# Patient Record
Sex: MALE | URBAN - NONMETROPOLITAN AREA
[De-identification: names, ages, dates, MRNs, and addresses within clinical notes are randomized per-mention and may not be internally consistent; named-entity substitution may affect disease eponyms.]

---

## 2017-01-03 ENCOUNTER — APPOINTMENT (RX ONLY)
Dept: URBAN - NONMETROPOLITAN AREA CLINIC 1 | Facility: CLINIC | Age: 62
Setting detail: DERMATOLOGY
End: 2017-01-03

## 2017-01-03 VITALS — WEIGHT: 68 LBS | HEIGHT: 78 IN

## 2017-01-03 DIAGNOSIS — L82.1 OTHER SEBORRHEIC KERATOSIS: ICD-10-CM

## 2017-01-03 DIAGNOSIS — L65.9 NONSCARRING HAIR LOSS, UNSPECIFIED: ICD-10-CM

## 2017-01-03 DIAGNOSIS — D22 MELANOCYTIC NEVI: ICD-10-CM

## 2017-01-03 DIAGNOSIS — B00.1 HERPESVIRAL VESICULAR DERMATITIS: ICD-10-CM

## 2017-01-03 PROBLEM — E78.5 HYPERLIPIDEMIA, UNSPECIFIED: Status: ACTIVE | Noted: 2017-01-03

## 2017-01-03 PROBLEM — I10 ESSENTIAL (PRIMARY) HYPERTENSION: Status: ACTIVE | Noted: 2017-01-03

## 2017-01-03 PROBLEM — H91.90 UNSPECIFIED HEARING LOSS, UNSPECIFIED EAR: Status: ACTIVE | Noted: 2017-01-03

## 2017-01-03 PROBLEM — D23.5 OTHER BENIGN NEOPLASM OF SKIN OF TRUNK: Status: ACTIVE | Noted: 2017-01-03

## 2017-01-03 PROBLEM — D22.39 MELANOCYTIC NEVI OF OTHER PARTS OF FACE: Status: ACTIVE | Noted: 2017-01-03

## 2017-01-03 PROCEDURE — ? COUNSELING

## 2017-01-03 PROCEDURE — 99242 OFF/OP CONSLTJ NEW/EST SF 20: CPT

## 2017-01-03 ASSESSMENT — LOCATION ZONE DERM
LOCATION ZONE: LEG
LOCATION ZONE: FACE

## 2017-01-03 ASSESSMENT — LOCATION DETAILED DESCRIPTION DERM
LOCATION DETAILED: SUPERIOR MID FOREHEAD
LOCATION DETAILED: LEFT SUPERIOR CENTRAL MALAR CHEEK
LOCATION DETAILED: LEFT DISTAL POSTERIOR THIGH

## 2017-01-03 ASSESSMENT — LOCATION SIMPLE DESCRIPTION DERM
LOCATION SIMPLE: LEFT CHEEK
LOCATION SIMPLE: SUPERIOR FOREHEAD
LOCATION SIMPLE: LEFT POSTERIOR THIGH

## 2017-11-02 ENCOUNTER — HOSPITAL ENCOUNTER (OUTPATIENT)
Dept: RADIOLOGY | Facility: MEDICAL CENTER | Age: 62
End: 2017-11-02
Attending: EMERGENCY MEDICINE
Payer: COMMERCIAL

## 2017-11-02 ENCOUNTER — OFFICE VISIT (OUTPATIENT)
Dept: URGENT CARE | Facility: PHYSICIAN GROUP | Age: 62
End: 2017-11-02
Payer: COMMERCIAL

## 2017-11-02 VITALS
WEIGHT: 190 LBS | HEART RATE: 78 BPM | BODY MASS INDEX: 28.79 KG/M2 | TEMPERATURE: 98.5 F | OXYGEN SATURATION: 98 % | HEIGHT: 68 IN | DIASTOLIC BLOOD PRESSURE: 84 MMHG | RESPIRATION RATE: 16 BRPM | SYSTOLIC BLOOD PRESSURE: 142 MMHG

## 2017-11-02 DIAGNOSIS — S60.221A CONTUSION OF RIGHT HAND, INITIAL ENCOUNTER: ICD-10-CM

## 2017-11-02 PROCEDURE — 99214 OFFICE O/P EST MOD 30 MIN: CPT | Performed by: EMERGENCY MEDICINE

## 2017-11-02 PROCEDURE — 73130 X-RAY EXAM OF HAND: CPT | Mod: RT

## 2017-11-03 ASSESSMENT — ENCOUNTER SYMPTOMS
EYE DISCHARGE: 0
TINGLING: 0
FOCAL WEAKNESS: 0
FEVER: 0
NAUSEA: 0
INSOMNIA: 0
VOMITING: 0
NERVOUS/ANXIOUS: 0
EYE REDNESS: 0
CHILLS: 0
BRUISES/BLEEDS EASILY: 0

## 2017-11-04 NOTE — PROGRESS NOTES
Subjective:      Maximiliano Bateman is a 62 y.o. 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 The hand developed a lump on the dorsum immediately and has been slowly resolving.  Allergies   Allergen Reactions   • Norco [Hydrocodone-Acetaminophen] Unspecified     Strange dreams   RXN=8/2016     Social History     Social History   • Marital status:      Spouse name: N/A   • Number of children: N/A   • Years of education: N/A     Occupational History   • Not on file.     Social History Main Topics   • Smoking status: Never Smoker   • Smokeless tobacco: Never Used   • Alcohol use No   • Drug use: No   • Sexual activity: Yes     Partners: Female     Birth control/ protection: Post-Menopausal     Other Topics Concern   • Not on file     Social History Narrative   • No narrative on file     Past Medical History:   Diagnosis Date   • Anesthesia     slow to wake after last surgery   • Dyslipidemia    • GERD (gastroesophageal reflux disease)    • Hiatus hernia syndrome    • High cholesterol    • Hypercholesteremia 3/1/2011   • Hyperlipidemia    • Hypertension    • Nonspecific elevation of levels of transaminase or lactic acid dehydrogenase (LDH) 3/11/2010   • Renal disorder     kidney stones     Current Outpatient Prescriptions on File Prior to Visit   Medication Sig Dispense Refill   • pantoprazole (PROTONIX) 40 MG Tablet Delayed Response Take 1 Tab by mouth every day. 90 Tab 3   • losartan-hydrochlorothiazide (HYZAAR) 50-12.5 MG per tablet Take 1 Tab by mouth every day. 90 Tab 3   • Omega-3 Fatty Acids (FISH OIL) 1000 MG Cap capsule Take 1,000 mg by mouth every day.     • Cholecalciferol (VITAMIN D-3 PO) Take 1 Tab by mouth every day.     • coenzyme Q-10 30 MG capsule Take 30 mg by mouth every day.     • aspirin 81 MG EC tablet Take 81 mg by mouth  "every evening.     • ipratropium (ATROVENT) 0.06 % Solution Spray 2 Sprays in nose 4 times a day as needed. For rhinitis 1 Bottle 3   • atorvastatin (LIPITOR) 40 MG Tab Take 0.5 Tabs by mouth every bedtime. 90 Tab 3   • tadalafil (CIALIS) 20 MG tablet Take 1 Tab by mouth as needed for Erectile Dysfunction. Take once in 36 hours 30 Tab 3   • phenazopyridine (PYRIDIUM) 200 MG Tab Take 1 Tab by mouth 3 times a day as needed (dysuria). 15 Tab 0   • tramadol (ULTRAM) 50 MG Tab Take 1 Tab by mouth every 6 hours as needed for Moderate Pain. 14 Tab 0   • ibuprofen (MOTRIN) 200 MG Tab Take 400-600 mg by mouth every 6 hours as needed for Mild Pain.       No current facility-administered medications on file prior to visit.      Family History   Problem Relation Age of Onset   • Hypertension Mother    • Hyperlipidemia Mother    • Hypertension Father    • Hyperlipidemia Father      Review of Systems   Constitutional: Negative for chills and fever.   Eyes: Negative for discharge and redness.   Cardiovascular: Negative for chest pain.   Gastrointestinal: Negative for nausea and vomiting.   Musculoskeletal: Negative for joint pain.   Skin:        Ecchymosis on dorsum of the right hand.   Neurological: Negative for tingling and focal weakness.   Endo/Heme/Allergies: Does not bruise/bleed easily.   Psychiatric/Behavioral: The patient is not nervous/anxious and does not have insomnia.           Objective:     /84   Pulse 78   Temp 36.9 °C (98.5 °F)   Resp 16   Ht 1.727 m (5' 8\")   Wt 86.2 kg (190 lb)   SpO2 98%   BMI 28.89 kg/m²      Physical Exam   Constitutional: He appears well-developed and well-nourished. No distress.   HENT:   Head: Normocephalic and atraumatic.   Cardiovascular: Normal rate.    Pulmonary/Chest: Effort normal.   Musculoskeletal: Normal range of motion. He exhibits edema and tenderness.   Ecchymosis over thr dorsum of his right hand, min]imallt tendrr with full ROM and nl neurovascular.   Skin: He is " not diaphoretic.   Vitals reviewed.       Right hand xray neg     Assessment/Plan:     1. Contusion of right hand, initial encounter    2. Subcutaneous hematoma    Pt will use his hand, ecchymosis will resolve slowly.

## 2017-11-30 DIAGNOSIS — K21.00 GASTROESOPHAGEAL REFLUX DISEASE WITH ESOPHAGITIS: ICD-10-CM

## 2017-11-30 RX ORDER — PANTOPRAZOLE SODIUM 40 MG/1
TABLET, DELAYED RELEASE ORAL
Qty: 90 TAB | Refills: 1 | Status: SHIPPED | OUTPATIENT
Start: 2017-11-30 | End: 2018-10-16 | Stop reason: SDUPTHER

## 2017-11-30 NOTE — TELEPHONE ENCOUNTER
Was the patient seen in the last year in this department? No     Does patient have an active prescription for medications requested?no      Received Request Via: Pharmacy

## 2017-12-14 DIAGNOSIS — I10 ESSENTIAL HYPERTENSION: ICD-10-CM

## 2017-12-14 NOTE — TELEPHONE ENCOUNTER
Was the patient seen in the last year in this department? No  LAST SEEN 11/22/16    Does patient have an active prescription for medications requested? No     Received Request Via: Pharmacy

## 2017-12-18 RX ORDER — LOSARTAN POTASSIUM AND HYDROCHLOROTHIAZIDE 12.5; 5 MG/1; MG/1
TABLET ORAL
Qty: 90 TAB | Refills: 0 | Status: SHIPPED | OUTPATIENT
Start: 2017-12-18 | End: 2018-05-24 | Stop reason: SDUPTHER

## 2018-04-10 ENCOUNTER — OFFICE VISIT (OUTPATIENT)
Dept: URGENT CARE | Facility: PHYSICIAN GROUP | Age: 63
End: 2018-04-10
Payer: COMMERCIAL

## 2018-04-10 VITALS
BODY MASS INDEX: 28.95 KG/M2 | OXYGEN SATURATION: 99 % | WEIGHT: 191 LBS | TEMPERATURE: 98.5 F | HEIGHT: 68 IN | HEART RATE: 82 BPM | SYSTOLIC BLOOD PRESSURE: 126 MMHG | DIASTOLIC BLOOD PRESSURE: 74 MMHG

## 2018-04-10 DIAGNOSIS — S46.912A: ICD-10-CM

## 2018-04-10 PROCEDURE — 99214 OFFICE O/P EST MOD 30 MIN: CPT | Performed by: NURSE PRACTITIONER

## 2018-04-10 RX ORDER — MELOXICAM 7.5 MG/1
7.5 TABLET ORAL DAILY
Qty: 30 TAB | Refills: 0 | Status: SHIPPED | OUTPATIENT
Start: 2018-04-10 | End: 2018-10-16

## 2018-04-10 ASSESSMENT — ENCOUNTER SYMPTOMS
TINGLING: 0
SENSORY CHANGE: 0
MYALGIAS: 1
EYES NEGATIVE: 1
RESPIRATORY NEGATIVE: 1
NECK PAIN: 0
CARDIOVASCULAR NEGATIVE: 1
CONSTITUTIONAL NEGATIVE: 1
GASTROINTESTINAL NEGATIVE: 1

## 2018-04-11 NOTE — NON-PROVIDER
"  Subjective:   CC: had concerns including Shoulder Pain (and bicep pain, X 1 week ).    HPI: New onset of left bicep pain 1 week ago after repeated lifting of 5 gallon bucket of water up a couple stairs to a platform. Pain was sharp and severe for first 2 days, improved but hasn't continued improving. Patient states no pain at rest, pain is sudden, feels like \"catching\", sore after sleeping at times. Denies associated neck pain, shoulder capsule pain, numbness or tingling in arm/fingers, swelling or bruising at site. Has taken OTC tylenol for pain and used sports rub ointment. Unsure of effectiveness overall due to intermittent nature of pain.    PMH/PSH:  has a past medical history of Anesthesia; Dyslipidemia; GERD (gastroesophageal reflux disease); Hiatus hernia syndrome; High cholesterol; Hypercholesteremia (3/1/2011); Hyperlipidemia; Hypertension; Nonspecific elevation of levels of transaminase or lactic acid dehydrogenase (LDH) (3/11/2010); and Renal disorder.   has a past surgical history that includes knee arthroscopy (2/11/2010); colonoscopy (2006); vasectomy; cystoscopy (Left, 9/7/2016); ureteroscopy (9/7/2016); cystoscopy (9/28/2016); ureteroscopy (9/28/2016); and lasertripsy (9/28/2016).    Social:  reports that he has never smoked. He has never used smokeless tobacco. He reports that he does not drink alcohol or use drugs.    Allergies: Norco [hydrocodone-acetaminophen]    Medication:   Current Outpatient Prescriptions on File Prior to Visit   Medication Sig Dispense Refill   • losartan-hydrochlorothiazide (HYZAAR) 50-12.5 MG per tablet TAKE 1 TABLET BY MOUTH EVERY DAY 90 Tab 0   • pantoprazole (PROTONIX) 40 MG Tablet Delayed Response TAKE 1 TABLET BY MOUTH EVERY DAY 90 Tab 1   • Omega-3 Fatty Acids (FISH OIL) 1000 MG Cap capsule Take 1,000 mg by mouth every day.     • Cholecalciferol (VITAMIN D-3 PO) Take 1 Tab by mouth every day.     • coenzyme Q-10 30 MG capsule Take 30 mg by mouth every day.     • " "aspirin 81 MG EC tablet Take 81 mg by mouth every evening.     • ipratropium (ATROVENT) 0.06 % Solution Spray 2 Sprays in nose 4 times a day as needed. For rhinitis 1 Bottle 3   • atorvastatin (LIPITOR) 40 MG Tab Take 0.5 Tabs by mouth every bedtime. 90 Tab 3   • tadalafil (CIALIS) 20 MG tablet Take 1 Tab by mouth as needed for Erectile Dysfunction. Take once in 36 hours 30 Tab 3   • phenazopyridine (PYRIDIUM) 200 MG Tab Take 1 Tab by mouth 3 times a day as needed (dysuria). 15 Tab 0   • tramadol (ULTRAM) 50 MG Tab Take 1 Tab by mouth every 6 hours as needed for Moderate Pain. 14 Tab 0   • ibuprofen (MOTRIN) 200 MG Tab Take 400-600 mg by mouth every 6 hours as needed for Mild Pain.       No current facility-administered medications on file prior to visit.          Review of Systems   Constitutional: Negative.    HENT: Negative.    Eyes: Negative.    Respiratory: Negative.    Cardiovascular: Negative.    Gastrointestinal: Negative.    Genitourinary: Negative.    Musculoskeletal: Positive for myalgias. Negative for joint pain and neck pain.   Skin: Negative.    Neurological: Negative for tingling and sensory change.   Endo/Heme/Allergies: Negative.    All other systems reviewed and are negative.       Objective:     /74   Pulse 82   Temp 36.9 °C (98.5 °F)   Ht 1.727 m (5' 8\")   Wt 86.6 kg (191 lb)   SpO2 99%   BMI 29.04 kg/m²     Physical Exam   Constitutional: He is oriented to person, place, and time. Vital signs are normal. He appears well-developed.   HENT:   Head: Normocephalic.   Eyes: Pupils are equal, round, and reactive to light.   Neck: Normal range of motion.   Cardiovascular: Normal rate and regular rhythm.    Pulmonary/Chest: Effort normal.   Musculoskeletal: Normal range of motion. He exhibits no edema.        Right upper arm: Normal.        Left upper arm: He exhibits tenderness. He exhibits no swelling, no edema and no deformity.   Left lateral biceps point tenderness   Neurological: He is " alert and oriented to person, place, and time. He has normal strength. No sensory deficit. He exhibits normal muscle tone. Coordination normal.   Skin: Skin is warm and dry.   Psychiatric: He has a normal mood and affect.   Vitals reviewed.          Assessment/Plan:     1. Muscle strain of upper arm, left, initial encounter    - meloxicam (MOBIC) 7.5 MG Tab; Take 1 Tab by mouth every day.  Dispense: 30 Tab; Refill: 0  - REFERRAL TO SPORTS MEDICINE      May use RICE method prn  Provide gentle stretch and ROM exercises to affected arm.   Monitor for increased swelling, increased pain- need re-evaluation

## 2018-04-11 NOTE — PROGRESS NOTES
Subjective:      Maximiliano Bateman is a 63 y.o. male who presents with Shoulder Pain (and bicep pain, X 1 week )            HPI  Maximiliano is here for left upper arm pain after lifting heavy object. See student note below for assessment and findings.     PMH:  has a past medical history of Anesthesia; Dyslipidemia; GERD (gastroesophageal reflux disease); Hiatus hernia syndrome; High cholesterol; Hypercholesteremia (3/1/2011); Hyperlipidemia; Hypertension; Nonspecific elevation of levels of transaminase or lactic acid dehydrogenase (LDH) (3/11/2010); and Renal disorder.  MEDS:   Current Outpatient Prescriptions:   •  meloxicam (MOBIC) 7.5 MG Tab, Take 1 Tab by mouth every day., Disp: 30 Tab, Rfl: 0  •  meloxicam (MOBIC) 7.5 MG Tab, Take 1 Tab by mouth every day., Disp: 30 Tab, Rfl: 0  •  losartan-hydrochlorothiazide (HYZAAR) 50-12.5 MG per tablet, TAKE 1 TABLET BY MOUTH EVERY DAY, Disp: 90 Tab, Rfl: 0  •  pantoprazole (PROTONIX) 40 MG Tablet Delayed Response, TAKE 1 TABLET BY MOUTH EVERY DAY, Disp: 90 Tab, Rfl: 1  •  Omega-3 Fatty Acids (FISH OIL) 1000 MG Cap capsule, Take 1,000 mg by mouth every day., Disp: , Rfl:   •  Cholecalciferol (VITAMIN D-3 PO), Take 1 Tab by mouth every day., Disp: , Rfl:   •  coenzyme Q-10 30 MG capsule, Take 30 mg by mouth every day., Disp: , Rfl:   •  aspirin 81 MG EC tablet, Take 81 mg by mouth every evening., Disp: , Rfl:   •  ipratropium (ATROVENT) 0.06 % Solution, Spray 2 Sprays in nose 4 times a day as needed. For rhinitis, Disp: 1 Bottle, Rfl: 3  •  atorvastatin (LIPITOR) 40 MG Tab, Take 0.5 Tabs by mouth every bedtime., Disp: 90 Tab, Rfl: 3  •  tadalafil (CIALIS) 20 MG tablet, Take 1 Tab by mouth as needed for Erectile Dysfunction. Take once in 36 hours, Disp: 30 Tab, Rfl: 3  •  phenazopyridine (PYRIDIUM) 200 MG Tab, Take 1 Tab by mouth 3 times a day as needed (dysuria)., Disp: 15 Tab, Rfl: 0  •  tramadol (ULTRAM) 50 MG Tab, Take 1 Tab by mouth every 6 hours as needed for Moderate  "Pain., Disp: 14 Tab, Rfl: 0  •  ibuprofen (MOTRIN) 200 MG Tab, Take 400-600 mg by mouth every 6 hours as needed for Mild Pain., Disp: , Rfl:   ALLERGIES:   Allergies   Allergen Reactions   • Norco [Hydrocodone-Acetaminophen] Unspecified     Strange dreams   RXN=8/2016     SURGHX:   Past Surgical History:   Procedure Laterality Date   • CYSTOSCOPY  9/28/2016    Procedure: CYSTOSCOPY;  Surgeon: Karson Mcclendon M.D.;  Location: SURGERY [facility];  Service:    • URETEROSCOPY  9/28/2016    Procedure: URETEROSCOPY;  Surgeon: Karson Mcclendon M.D.;  Location: SURGERY [facility];  Service:    • LASERTRIPSY  9/28/2016    Procedure: LASER LITHOTRIPSY;  Surgeon: Karson Mcclendon M.D.;  Location: SURGERY [facility];  Service:    • CYSTOSCOPY Left 9/7/2016    Procedure: CYSTOSCOPY;  Surgeon: Karson Mcclendon M.D.;  Location: SURGERY [facility];  Service:    • URETEROSCOPY  9/7/2016    Procedure: URETEROSCOPY; stent placement;  Surgeon: Karson Mcclendon M.D.;  Location: SURGERY [facility];  Service:    • KNEE ARTHROSCOPY  2/11/2010    Performed by MIGUEL RICO at [facility]   • COLONOSCOPY  2006    normal   • VASECTOMY       SOCHX:  reports that he has never smoked. He has never used smokeless tobacco. He reports that he does not drink alcohol or use drugs.  FH: Family history was reviewed, no pertinent findings to report    ROS       Objective:     /74   Pulse 82   Temp 36.9 °C (98.5 °F)   Ht 1.727 m (5' 8\")   Wt 86.6 kg (191 lb)   SpO2 99%   BMI 29.04 kg/m²      Physical Exam          MA applied ace wrap to left upper bicep  Assessment/Plan:     1. Muscle strain of upper arm, left, initial encounter    - meloxicam (MOBIC) 7.5 MG Tab; Take 1 Tab by mouth every day.  Dispense: 30 Tab; Refill: 0  - REFERRAL TO SPORTS MEDICINE    Will follow up with Dr. Nery becerra if pain with movement continues  I concur with student assessment and findings  "

## 2018-05-24 DIAGNOSIS — I10 ESSENTIAL HYPERTENSION: ICD-10-CM

## 2018-05-24 RX ORDER — LOSARTAN POTASSIUM AND HYDROCHLOROTHIAZIDE 12.5; 5 MG/1; MG/1
1 TABLET ORAL
Qty: 90 TAB | Refills: 0 | Status: SHIPPED | OUTPATIENT
Start: 2018-05-24 | End: 2018-08-31 | Stop reason: SDUPTHER

## 2018-05-24 NOTE — TELEPHONE ENCOUNTER
Was the patient seen in the last year in this department? No  LAST SEEN BY YOU 11/26/2016    Does patient have an active prescription for medications requested? No     Received Request Via: Pharmacy

## 2018-10-15 DIAGNOSIS — E55.9 VITAMIN D DEFICIENCY: ICD-10-CM

## 2018-10-15 DIAGNOSIS — E78.00 HYPERCHOLESTEREMIA: Chronic | ICD-10-CM

## 2018-10-15 DIAGNOSIS — E34.9 HYPOTESTOSTERONISM: ICD-10-CM

## 2018-10-15 DIAGNOSIS — Z12.5 PROSTATE CANCER SCREENING: ICD-10-CM

## 2018-10-16 ENCOUNTER — OFFICE VISIT (OUTPATIENT)
Dept: MEDICAL GROUP | Facility: PHYSICIAN GROUP | Age: 63
End: 2018-10-16
Payer: COMMERCIAL

## 2018-10-16 VITALS
OXYGEN SATURATION: 94 % | TEMPERATURE: 98.9 F | SYSTOLIC BLOOD PRESSURE: 130 MMHG | BODY MASS INDEX: 29.4 KG/M2 | DIASTOLIC BLOOD PRESSURE: 80 MMHG | RESPIRATION RATE: 14 BRPM | HEIGHT: 68 IN | WEIGHT: 194 LBS | HEART RATE: 80 BPM

## 2018-10-16 DIAGNOSIS — K44.9 HIATUS HERNIA SYNDROME: ICD-10-CM

## 2018-10-16 DIAGNOSIS — I10 ESSENTIAL HYPERTENSION: ICD-10-CM

## 2018-10-16 DIAGNOSIS — Z00.00 HEALTH CARE MAINTENANCE: Primary | ICD-10-CM

## 2018-10-16 DIAGNOSIS — N52.9 ERECTILE DYSFUNCTION, UNSPECIFIED ERECTILE DYSFUNCTION TYPE: ICD-10-CM

## 2018-10-16 DIAGNOSIS — K21.00 GASTROESOPHAGEAL REFLUX DISEASE WITH ESOPHAGITIS: ICD-10-CM

## 2018-10-16 DIAGNOSIS — E78.2 MIXED HYPERLIPIDEMIA: ICD-10-CM

## 2018-10-16 DIAGNOSIS — E34.9 HYPOTESTOSTERONISM: ICD-10-CM

## 2018-10-16 DIAGNOSIS — E78.00 HYPERCHOLESTEREMIA: Chronic | ICD-10-CM

## 2018-10-16 PROCEDURE — 99396 PREV VISIT EST AGE 40-64: CPT | Performed by: FAMILY MEDICINE

## 2018-10-16 RX ORDER — PANTOPRAZOLE SODIUM 40 MG/1
40 TABLET, DELAYED RELEASE ORAL
Qty: 90 TAB | Refills: 3 | Status: SHIPPED | OUTPATIENT
Start: 2018-10-16 | End: 2019-12-09 | Stop reason: SDUPTHER

## 2018-10-16 RX ORDER — LOSARTAN POTASSIUM AND HYDROCHLOROTHIAZIDE 12.5; 5 MG/1; MG/1
TABLET ORAL
Qty: 90 TAB | Refills: 3 | Status: SHIPPED | OUTPATIENT
Start: 2018-10-16 | End: 2019-07-11 | Stop reason: SDUPTHER

## 2018-10-16 ASSESSMENT — PATIENT HEALTH QUESTIONNAIRE - PHQ9: CLINICAL INTERPRETATION OF PHQ2 SCORE: 0

## 2018-10-17 NOTE — PROGRESS NOTES
Patient comes in for annual exam.  I have not seen him in several years.  I have already ordered lab work for him but he has not done it yet.  He has no chest pains as long as he stays on Protonix 40 mg p.o. daily.  He does have an underlying hiatus hernia.  He still is having issues with erectile dysfunction and is not responded well to either Viagra or Cialis.  They have not been effective.  The patient is not taking generic Lipitor currently because he said he read things about side effects with elevated cholesterol.  Nonetheless, in the past I have seen quite high lipid levels in this patient and I fear that he probably will need to be on something to control his cholesterol.  He did not really have specific complaints with generic Lipitor he just was worried about taking it.      Review of Systems   Constitutional: Negative.  Negative for fever, chills, weight loss and malaise/fatigue.   HENT: Negative for hearing loss, ear pain, congestion, sore throat, neck pain and tinnitus.    Eyes: Negative for blurred vision, double vision and pain.   Respiratory: Negative for cough, hemoptysis, shortness of breath and wheezing.    Cardiovascular: Negative for chest pain, palpitations, orthopnea, claudication, leg swelling and PND.   Gastrointestinal: Positive for heartburn if he does not take his Protonix, negative for nausea, vomiting, abdominal pain, diarrhea, constipation, blood in stool and melena.   Genitourinary: Negative for incontinence, dysuria, urgency, frequency and hematuria. Male--positive for erectile dysfunction  Musculoskeletal: Negative for myalgias, back pain and joint pain.   Skin: Negative for rash and itching. No lesions that will not heal.  Neurological: Negative for dizziness, tingling, tremors, focal weakness, seizures, loss of consciousness and headaches.   Endo/Heme/Allergies: Negative for environmental allergies and polydipsia.  Does not bruise/bleed easily.   Psychiatric/Behavioral: Negative  for depression, memory loss and substance abuse.  The patient is not nervous/anxious and does not have insomnia.  All others negative.      I reviewed the following    Past Medical History:   Diagnosis Date   • Anesthesia     slow to wake after last surgery   • Dyslipidemia    • GERD (gastroesophageal reflux disease)    • Hiatus hernia syndrome    • High cholesterol    • Hypercholesteremia 3/1/2011   • Hyperlipidemia    • Hypertension    • Nonspecific elevation of levels of transaminase or lactic acid dehydrogenase (LDH) 3/11/2010   • Renal disorder     kidney stones        Past Surgical History:   Procedure Laterality Date   • CYSTOSCOPY  9/28/2016    Procedure: CYSTOSCOPY;  Surgeon: Karson Mcclendon M.D.;  Location: [facility];  Service:    • URETEROSCOPY  9/28/2016    Procedure: URETEROSCOPY;  Surgeon: Karson Mcclendon M.D.;  Location: [facility];  Service:    • LASERTRIPSY  9/28/2016    Procedure: LASER LITHOTRIPSY;  Surgeon: Karson Mcclendon M.D.;  Location: [facility];  Service:    • CYSTOSCOPY Left 9/7/2016    Procedure: CYSTOSCOPY;  Surgeon: Karson Mcclendon M.D.;  Location: SURGERY [facility];  Service:    • URETEROSCOPY  9/7/2016    Procedure: URETEROSCOPY; stent placement;  Surgeon: Karson Mcclendon M.D.;  Location: [facility];  Service:    • KNEE ARTHROSCOPY  2/11/2010    Performed by MIGUEL RICO at [facility]   • COLONOSCOPY  2006    normal   • VASECTOMY         Allergies   Allergen Reactions   • Norco [Hydrocodone-Acetaminophen] Unspecified     Strange dreams   RXN=8/2016       Current Outpatient Prescriptions   Medication Sig Dispense Refill   • losartan-hydrochlorothiazide (HYZAAR) 50-12.5 MG per tablet TAKE 1 TABLET BY MOUTH ONCE DAILY 90 Tab 3   • pantoprazole (PROTONIX) 40 MG Tablet Delayed Response Take 1 Tab by mouth every day. 90 Tab 3   • atorvastatin (LIPITOR) 40 MG Tab Take 0.5 Tabs by mouth every bedtime. 90 Tab 3   •  Cholecalciferol (VITAMIN D-3 PO) Take 1 Tab by mouth every day.     • coenzyme Q-10 30 MG capsule Take 30 mg by mouth every day.     • aspirin 81 MG EC tablet Take 81 mg by mouth every evening.     • phenazopyridine (PYRIDIUM) 200 MG Tab Take 1 Tab by mouth 3 times a day as needed (dysuria). (Patient not taking: Reported on 10/16/2018) 15 Tab 0   • tramadol (ULTRAM) 50 MG Tab Take 1 Tab by mouth every 6 hours as needed for Moderate Pain. (Patient not taking: Reported on 10/16/2018) 14 Tab 0   • ibuprofen (MOTRIN) 200 MG Tab Take 400-600 mg by mouth every 6 hours as needed for Mild Pain.       No current facility-administered medications for this visit.         Family History   Problem Relation Age of Onset   • Hypertension Mother    • Hyperlipidemia Mother    • Hypertension Father    • Hyperlipidemia Father        Social History     Social History   • Marital status:      Spouse name: N/A   • Number of children: N/A   • Years of education: N/A     Occupational History   • Not on file.     Social History Main Topics   • Smoking status: Never Smoker   • Smokeless tobacco: Never Used   • Alcohol use No   • Drug use: No   • Sexual activity: Yes     Partners: Female     Birth control/ protection: Post-Menopausal     Other Topics Concern   • Not on file     Social History Narrative   • No narrative on file        Physical Exam   Nursing note and vitals reviewed.  Constitutional: He is oriented. He appears well-developed and well-nourished. He appears not diaphoretic. No distress.   Head: Normocephalic and atraumatic.   Right Ear: External ear normal. Ear canal and TM normal  Left Ear: External ear normal. Ear canal and TM normal  Nose: Nose normal.   Mouth/Throat: Oropharynx is clear and moist. No oropharyngeal exudate.   Eyes: Conjunctivae and extraocular motions are normal. Pupils are equal, round, and reactive to light. Right eye exhibits no discharge. Left eye exhibits no discharge. No scleral icterus. Fundi  benign bilaterally   Neck: Normal range of motion. Neck supple. No JVD present. No tracheal deviation present. No thyromegaly present.   Cardiovascular: Normal rate, regular rhythm, normal heart sounds and intact distal pulses.  Exam reveals no gallop and no friction rub.    No murmur heard.  Pulmonary/Chest: Effort normal and breath sounds normal. No stridor. No respiratory distress. He has no wheezes. He has no rales. He exhibits no tenderness.   Abdominal: Soft. Bowel sounds are normal. He exhibits no distension and no mass. No tenderness. No hepatosplenomegaly. He has no rebound and no guarding. Hernia confirmed negative in the right inguinal area and confirmed negative in the left inguinal area.   Genitourinary: Penis normal. Rectal exam shows no external hemorrhoid, no internal hemorrhoid, no fissure, no mass, no tenderness and anal tone normal. Guaiac negative stool. Prostate is 1+ enlarged and not tender. There are no prostate masses. Right testis shows no mass, no swelling, no tenderness. Right testis is descended. Left testis shows no mass, no swelling, no tenderness. Left testis is descended. Circumcised. No phimosis, penile erythema or penile tenderness. No discharge found.   Musculoskeletal: Normal range of motion. He exhibits no edema and no tenderness.   Lymphadenopathy:     He has no cervical adenopathy.   No supraclavicular adenopathy.       Right: No inguinal adenopathy present.        Left: No inguinal adenopathy present.   Neurological: He is alert and oriented. He displays normal reflexes. No cranial nerve deficit. He exhibits normal muscle tone. Coordination normal.   Skin: Skin is warm and dry. No rash noted. He is not diaphoretic. No erythema. No pallor.   Psychiatric: He has a normal mood and appropriate affect. His behavior is normal. Judgment and thought content normal.     1. Health care maintenance   patient is doing well.  Await lab work which is already all been ordered   2.  Hypercholesteremia   await CMP and lipid profile.  I may encourage him to go back on generic Lipitor but also take co-Q10 with it.   3. Mixed hyperlipidemia   as above   4. Essential hypertension --controlled losartan-hydrochlorothiazide (HYZAAR) 50-12.5 MG per tablet   5. Hypotestosteronism   await testosterone level   6. Hiatus hernia syndrome   continue Protonix   7. Erectile dysfunction, unspecified erectile dysfunction type  REFERRAL TO UROLOGY--Dr. Joaquim Vanegas   8. Gastroesophageal reflux disease with esophagitis  pantoprazole (PROTONIX) 40 MG Tablet Delayed Response--refill     Recheck 1 year or as needed lab results    Please note that this dictation was created using voice recognition software. I have worked with consultants from the vendor as well as technical experts from [employer] to optimize the interface. I have made every reasonable attempt to correct obvious errors, but I expect that there are errors of grammar and possibly content that I did not discover before finalizing the note.

## 2018-10-17 NOTE — PATIENT INSTRUCTIONS
Patient given written instructions regarding labs,  medications, referrals, dietary and lifestyle management, and return visit.    Edson Douglas MD

## 2019-01-11 LAB
25(OH)D3+25(OH)D2 SERPL-MCNC: 29.4 NG/ML (ref 30–100)
ALBUMIN SERPL-MCNC: 4.5 G/DL (ref 3.6–4.8)
ALBUMIN/GLOB SERPL: 1.6 {RATIO} (ref 1.2–2.2)
ALP SERPL-CCNC: 76 IU/L (ref 39–117)
ALT SERPL-CCNC: 37 IU/L (ref 0–44)
APPEARANCE UR: CLEAR
AST SERPL-CCNC: 18 IU/L (ref 0–40)
BACTERIA #/AREA URNS HPF: NORMAL /[HPF]
BASOPHILS # BLD AUTO: 0 X10E3/UL (ref 0–0.2)
BASOPHILS NFR BLD AUTO: 0 %
BILIRUB SERPL-MCNC: 0.6 MG/DL (ref 0–1.2)
BILIRUB UR QL STRIP: NEGATIVE
BUN SERPL-MCNC: 18 MG/DL (ref 8–27)
BUN/CREAT SERPL: 18 (ref 10–24)
CALCIUM SERPL-MCNC: 9.5 MG/DL (ref 8.6–10.2)
CASTS URNS MICRO: NORMAL
CASTS URNS QL MICRO: NORMAL
CHLORIDE SERPL-SCNC: 101 MMOL/L (ref 96–106)
CHOLEST SERPL-MCNC: 262 MG/DL (ref 100–199)
CO2 SERPL-SCNC: 24 MMOL/L (ref 20–29)
COLOR UR: YELLOW
CREAT SERPL-MCNC: 0.99 MG/DL (ref 0.76–1.27)
CRYSTALS URNS MICRO: NORMAL
EOSINOPHIL # BLD AUTO: 0.3 X10E3/UL (ref 0–0.4)
EOSINOPHIL NFR BLD AUTO: 3 %
EPI CELLS #/AREA URNS HPF: NORMAL /HPF
ERYTHROCYTE [DISTWIDTH] IN BLOOD BY AUTOMATED COUNT: 13.1 % (ref 12.3–15.4)
GLOBULIN SER CALC-MCNC: 2.8 G/DL (ref 1.5–4.5)
GLUCOSE SERPL-MCNC: 94 MG/DL (ref 65–99)
GLUCOSE UR QL: NEGATIVE
HCT VFR BLD AUTO: 47.5 % (ref 37.5–51)
HDLC SERPL-MCNC: 51 MG/DL
HGB BLD-MCNC: 15.9 G/DL (ref 13–17.7)
HGB UR QL STRIP: ABNORMAL
IF AFRICAN AMERICAN  100797: 94 ML/MIN/1.73
IF NON AFRICAN AMER 100791: 81 ML/MIN/1.73
IMM GRANULOCYTES # BLD AUTO: 0 X10E3/UL (ref 0–0.1)
IMM GRANULOCYTES NFR BLD AUTO: 0 %
IMMATURE CELLS  115398: NORMAL
KETONES UR QL STRIP: NEGATIVE
LABORATORY COMMENT REPORT: ABNORMAL
LDLC SERPL CALC-MCNC: 186 MG/DL (ref 0–99)
LEUKOCYTE ESTERASE UR QL STRIP: NEGATIVE
LYMPHOCYTES # BLD AUTO: 2.9 X10E3/UL (ref 0.7–3.1)
LYMPHOCYTES NFR BLD AUTO: 30 %
MCH RBC QN AUTO: 29.4 PG (ref 26.6–33)
MCHC RBC AUTO-ENTMCNC: 33.5 G/DL (ref 31.5–35.7)
MCV RBC AUTO: 88 FL (ref 79–97)
MICRO URNS: ABNORMAL
MICRO URNS: ABNORMAL
MONOCYTES # BLD AUTO: 0.9 X10E3/UL (ref 0.1–0.9)
MONOCYTES NFR BLD AUTO: 9 %
MORPHOLOGY BLD-IMP: NORMAL
MUCOUS THREADS URNS QL MICRO: PRESENT
NEUTROPHILS # BLD AUTO: 5.3 X10E3/UL (ref 1.4–7)
NEUTROPHILS NFR BLD AUTO: 58 %
NITRITE UR QL STRIP: NEGATIVE
NRBC BLD AUTO-RTO: NORMAL %
PH UR STRIP: 5 [PH] (ref 5–7.5)
PLATELET # BLD AUTO: 279 X10E3/UL (ref 150–379)
POTASSIUM SERPL-SCNC: 4.3 MMOL/L (ref 3.5–5.2)
PROT SERPL-MCNC: 7.3 G/DL (ref 6–8.5)
PROT UR QL STRIP: NEGATIVE
PSA SERPL-MCNC: 0.8 NG/ML (ref 0–4)
RBC # BLD AUTO: 5.4 X10E6/UL (ref 4.14–5.8)
RBC #/AREA URNS HPF: NORMAL /HPF
RENAL EPI CELLS #/AREA URNS HPF: NORMAL /[HPF]
SODIUM SERPL-SCNC: 139 MMOL/L (ref 134–144)
SP GR UR: 1.03 (ref 1–1.03)
T VAGINALIS URNS QL MICRO: NORMAL
TESTOST SERPL-MCNC: 298 NG/DL (ref 264–916)
TRIGL SERPL-MCNC: 124 MG/DL (ref 0–149)
UNIDENT CRYS URNS QL MICRO: NORMAL
URINALYSIS REFLEX  377202: ABNORMAL
URNS CMNT MICRO: NORMAL
UROBILINOGEN UR STRIP-MCNC: 0.2 MG/DL (ref 0.2–1)
VLDLC SERPL CALC-MCNC: 25 MG/DL (ref 5–40)
WBC # BLD AUTO: 9.4 X10E3/UL (ref 3.4–10.8)
WBC #/AREA URNS HPF: NORMAL /HPF
YEAST #/AREA URNS HPF: NORMAL /[HPF]

## 2019-02-04 DIAGNOSIS — E78.00 HYPERCHOLESTEREMIA: Chronic | ICD-10-CM

## 2019-02-04 RX ORDER — ATORVASTATIN CALCIUM 80 MG/1
80 TABLET, FILM COATED ORAL
Qty: 90 TAB | Refills: 3 | Status: SHIPPED
Start: 2019-02-04 | End: 2020-02-05

## 2019-05-11 ENCOUNTER — HOSPITAL ENCOUNTER (OUTPATIENT)
Dept: RADIOLOGY | Facility: MEDICAL CENTER | Age: 64
End: 2019-05-11
Attending: PHYSICIAN ASSISTANT
Payer: COMMERCIAL

## 2019-05-11 ENCOUNTER — OFFICE VISIT (OUTPATIENT)
Dept: URGENT CARE | Facility: PHYSICIAN GROUP | Age: 64
End: 2019-05-11
Payer: COMMERCIAL

## 2019-05-11 VITALS
OXYGEN SATURATION: 95 % | HEIGHT: 68 IN | SYSTOLIC BLOOD PRESSURE: 128 MMHG | TEMPERATURE: 99.3 F | DIASTOLIC BLOOD PRESSURE: 76 MMHG | BODY MASS INDEX: 29.43 KG/M2 | HEART RATE: 78 BPM | WEIGHT: 194.2 LBS

## 2019-05-11 DIAGNOSIS — N20.1 RIGHT URETERAL STONE: ICD-10-CM

## 2019-05-11 DIAGNOSIS — R10.9 FLANK PAIN: ICD-10-CM

## 2019-05-11 DIAGNOSIS — Z87.442 HISTORY OF RENAL STONE: ICD-10-CM

## 2019-05-11 LAB
APPEARANCE UR: NORMAL
BILIRUB UR STRIP-MCNC: NEGATIVE MG/DL
COLOR UR AUTO: NORMAL
GLUCOSE UR STRIP.AUTO-MCNC: NEGATIVE MG/DL
KETONES UR STRIP.AUTO-MCNC: NEGATIVE MG/DL
LEUKOCYTE ESTERASE UR QL STRIP.AUTO: NEGATIVE
NITRITE UR QL STRIP.AUTO: NEGATIVE
PH UR STRIP.AUTO: 5.5 [PH] (ref 5–8)
PROT UR QL STRIP: 30 MG/DL
RBC UR QL AUTO: NORMAL
SP GR UR STRIP.AUTO: 1.01
UROBILINOGEN UR STRIP-MCNC: 0.2 MG/DL

## 2019-05-11 PROCEDURE — 99214 OFFICE O/P EST MOD 30 MIN: CPT | Performed by: PHYSICIAN ASSISTANT

## 2019-05-11 PROCEDURE — 81002 URINALYSIS NONAUTO W/O SCOPE: CPT | Performed by: PHYSICIAN ASSISTANT

## 2019-05-11 PROCEDURE — 74176 CT ABD & PELVIS W/O CONTRAST: CPT

## 2019-05-11 RX ORDER — KETOROLAC TROMETHAMINE 30 MG/ML
60 INJECTION, SOLUTION INTRAMUSCULAR; INTRAVENOUS ONCE
Status: COMPLETED | OUTPATIENT
Start: 2019-05-11 | End: 2019-05-11

## 2019-05-11 RX ORDER — TAMSULOSIN HYDROCHLORIDE 0.4 MG/1
0.4 CAPSULE ORAL
Qty: 7 CAP | Refills: 0 | Status: SHIPPED | OUTPATIENT
Start: 2019-05-11 | End: 2019-05-18

## 2019-05-11 RX ADMIN — KETOROLAC TROMETHAMINE 60 MG: 30 INJECTION, SOLUTION INTRAMUSCULAR; INTRAVENOUS at 12:57

## 2019-05-11 ASSESSMENT — ENCOUNTER SYMPTOMS
BOWEL INCONTINENCE: 0
WHEEZING: 0
BLOOD IN STOOL: 0
EYE DISCHARGE: 0
EYE REDNESS: 0
CHILLS: 1
DIZZINESS: 0
NECK PAIN: 0
MYALGIAS: 0
DIARRHEA: 0
COUGH: 0
CONSTIPATION: 0
SORE THROAT: 0
VOMITING: 0
NAUSEA: 1
BACK PAIN: 1
HEADACHES: 0
TINGLING: 0
HEARTBURN: 0
ABDOMINAL PAIN: 1

## 2019-05-11 NOTE — PROGRESS NOTES
Subjective:      Maximiliano Bateman is a 64 y.o. male who presents with Flank Pain (poss kidney stones, upset stomach, fatigue, onset midnight last night)            Pt is 65 y/o male who presents with right sided flank pain that developed last night. He thought it was something he ate, however the pain became similar to prior renal stone. Last stone was 2016,  Of which patient needed stent along with stone retrieval.  Patient reports that his pain is significantly better at this time as patient was taking Tylenol this morning.  He denies any urinary symptoms of urgency, dysuria or hematuria.  On further discussion patient does report that his stones might be due to uric acid or other variation as this is not been shown on an x-ray in the past.  Patient is with his wife today who also provides history.          Back Pain   This is a new problem. The current episode started yesterday. The problem occurs constantly. The problem has been waxing and waning since onset. Pain location: Right flank pain. The pain is moderate. Associated symptoms include abdominal pain. Pertinent negatives include no bladder incontinence, bowel incontinence, chest pain, dysuria, headaches or tingling. Treatments tried: Tylenol. The treatment provided significant relief.       Review of Systems   Constitutional: Positive for chills and malaise/fatigue.   HENT: Negative for congestion, ear discharge and sore throat.    Eyes: Negative for discharge and redness.   Respiratory: Negative for cough and wheezing.    Cardiovascular: Negative for chest pain.   Gastrointestinal: Positive for abdominal pain and nausea. Negative for blood in stool, bowel incontinence, constipation, diarrhea, heartburn, melena and vomiting.   Genitourinary: Negative for bladder incontinence and dysuria.   Musculoskeletal: Positive for back pain. Negative for myalgias and neck pain.   Skin: Negative for itching and rash.   Neurological: Negative for dizziness, tingling  "and headaches.          Objective:     /76 (BP Location: Right arm, Patient Position: Sitting, BP Cuff Size: Adult)   Pulse 78   Temp 37.4 °C (99.3 °F) (Temporal)   Ht 1.727 m (5' 8\")   Wt 88.1 kg (194 lb 3.2 oz)   SpO2 95%   BMI 29.53 kg/m²    PMH:  has a past medical history of Anesthesia; Dyslipidemia; GERD (gastroesophageal reflux disease); Hiatus hernia syndrome; High cholesterol; Hypercholesteremia (3/1/2011); Hyperlipidemia; Hypertension; Nonspecific elevation of levels of transaminase or lactic acid dehydrogenase (LDH) (3/11/2010); and Renal disorder.  MEDS:   Current Outpatient Prescriptions:   •  atorvastatin (LIPITOR) 80 MG tablet, Take 1 Tab by mouth every bedtime., Disp: 90 Tab, Rfl: 3  •  losartan-hydrochlorothiazide (HYZAAR) 50-12.5 MG per tablet, TAKE 1 TABLET BY MOUTH ONCE DAILY, Disp: 90 Tab, Rfl: 3  •  pantoprazole (PROTONIX) 40 MG Tablet Delayed Response, Take 1 Tab by mouth every day., Disp: 90 Tab, Rfl: 3  •  phenazopyridine (PYRIDIUM) 200 MG Tab, Take 1 Tab by mouth 3 times a day as needed (dysuria)., Disp: 15 Tab, Rfl: 0  •  tramadol (ULTRAM) 50 MG Tab, Take 1 Tab by mouth every 6 hours as needed for Moderate Pain., Disp: 14 Tab, Rfl: 0  •  Cholecalciferol (VITAMIN D-3 PO), Take 1 Tab by mouth every day., Disp: , Rfl:   •  coenzyme Q-10 30 MG capsule, Take 30 mg by mouth every day., Disp: , Rfl:   •  ibuprofen (MOTRIN) 200 MG Tab, Take 400-600 mg by mouth every 6 hours as needed for Mild Pain., Disp: , Rfl:   •  aspirin 81 MG EC tablet, Take 81 mg by mouth every evening., Disp: , Rfl:   ALLERGIES:   Allergies   Allergen Reactions   • Norco [Hydrocodone-Acetaminophen] Unspecified     Strange dreams   RXN=8/2016     SURGHX:   Past Surgical History:   Procedure Laterality Date   • CYSTOSCOPY  9/28/2016    Procedure: CYSTOSCOPY;  Surgeon: Karson Mcclendon M.D.;  Location: SURGERY [facility];  Service:    • URETEROSCOPY  9/28/2016    Procedure: URETEROSCOPY;  Surgeon: Karson ALANIS" WILNER Mcclendon;  Location: SURGERY [facility];  Service:    • LASERTRIPSY  9/28/2016    Procedure: LASER LITHOTRIPSY;  Surgeon: Karson Mcclendon M.D.;  Location: SURGERY [facility];  Service:    • CYSTOSCOPY Left 9/7/2016    Procedure: CYSTOSCOPY;  Surgeon: Karson Mcclendon M.D.;  Location: SURGERY [facility];  Service:    • URETEROSCOPY  9/7/2016    Procedure: URETEROSCOPY; stent placement;  Surgeon: Karson Mcclendon M.D.;  Location: SURGERY [facility];  Service:    • KNEE ARTHROSCOPY  2/11/2010    Performed by MIGUEL RICO at [facility]   • COLONOSCOPY  2006    normal   • VASECTOMY       SOCHX:  reports that he has never smoked. He has never used smokeless tobacco. He reports that he does not drink alcohol or use drugs.  FH: Family history was reviewed, no pertinent findings to report    Physical Exam   Constitutional: He is oriented to person, place, and time. He appears well-developed and well-nourished. No distress.   HENT:   Head: Normocephalic and atraumatic.   Right Ear: External ear normal.   Left Ear: External ear normal.   Mouth/Throat: Oropharynx is clear and moist. No oropharyngeal exudate.   Eyes: Pupils are equal, round, and reactive to light. Conjunctivae and EOM are normal.   Neck: Normal range of motion. Neck supple. No tracheal deviation present.   Cardiovascular: Normal rate and regular rhythm.    No murmur heard.  Pulmonary/Chest: Effort normal and breath sounds normal. No respiratory distress.   Abdominal: Soft. Bowel sounds are normal. There is tenderness.       Minimal right flank tenderness without rebound, rigidity.  Minimal positive right CVAT.   Musculoskeletal: Normal range of motion. He exhibits no edema.   Neurological: He is alert and oriented to person, place, and time. Coordination normal.   Skin: Skin is warm. No rash noted.   Psychiatric: He has a normal mood and affect. His behavior is normal. Judgment and thought content normal.   Vitals  reviewed.              POC Color dark yellow  Negative Final   POC Appearance cloudy  Negative Final   POC Leukocyte Esterase negative  Negative Final   POC Nitrites negative  Negative Final   POC Urobiligen 0.2  Negative (0.2) mg/dL Final   POC Protein 30  Negative mg/dL Final   POC Urine PH 5.5  5.0 - 8.0 Final   POC Blood large  Negative Final   POC Specific Gravity 1.015  <1.005 - >1.030 Final   POC Ketones negative  Negative mg/dL Final   POC Bilirubin negative  Negative mg/dL Final   POC Glucose negative  Negative mg/dL Final       Assessment/Plan:     1. Flank pain  - POCT Urinalysis  - ketorolac (TORADOL) injection 60 mg; 2 mL by Intramuscular route Once.  - CT-RENAL COLIC EVALUATION(A/P W/O); Future    2. History of renal stone    Unfortunately prior stone was not revealed on x-ray which CT renal colic was ordered today for further evaluation as patient has had large stones in the past with requiring stent and then removal of the stone.  Patient is very agreeable to such at this time.  We will give the patient Toradol in clinic.  Patient's contact information was confirmed and I will follow-up with this patient as CT results return.    Patient given precautionary s/sx that mandate immediate follow up and evaluation in the ED. Advised of risks of not doing so.    DDX, Supportive care, and indications for immediate follow-up discussed with patient.    Instructed to return to clinic or nearest emergency department if we are not available for any change in condition, further concerns, or worsening of symptoms.    The patient demonstrated a good understanding and agreed with the treatment plan.  Please note that this dictation was created using voice recognition software. I have made every reasonable attempt to correct obvious errors, but I expect that there are errors of grammar and possibly content that I did not discover before finalizing the note.      1500- Called and spoke with patient regarding stone- he  "reports feeling a little tired, however admits that he \"feels fine\" in terms of pain-will write for limits at this time patient has Toradol at home for use if needed.  Encouraged increase fluid intake-discussed other incidental findings noted on CT scan.  All questions were discussed.   Phone call was dropped twice and I attempted to reach again, however \"phone out of service.\"  "

## 2019-07-11 DIAGNOSIS — I10 ESSENTIAL HYPERTENSION: ICD-10-CM

## 2019-07-13 RX ORDER — LOSARTAN POTASSIUM AND HYDROCHLOROTHIAZIDE 12.5; 5 MG/1; MG/1
TABLET ORAL
Qty: 90 TAB | Refills: 2 | Status: SHIPPED | OUTPATIENT
Start: 2019-07-13 | End: 2020-02-12 | Stop reason: SDUPTHER

## 2019-09-20 ENCOUNTER — OFFICE VISIT (OUTPATIENT)
Dept: URGENT CARE | Facility: PHYSICIAN GROUP | Age: 64
End: 2019-09-20
Payer: COMMERCIAL

## 2019-09-20 ENCOUNTER — HOSPITAL ENCOUNTER (OUTPATIENT)
Dept: RADIOLOGY | Facility: MEDICAL CENTER | Age: 64
End: 2019-09-20
Attending: NURSE PRACTITIONER
Payer: COMMERCIAL

## 2019-09-20 VITALS
HEART RATE: 64 BPM | BODY MASS INDEX: 30.76 KG/M2 | OXYGEN SATURATION: 95 % | RESPIRATION RATE: 12 BRPM | SYSTOLIC BLOOD PRESSURE: 160 MMHG | TEMPERATURE: 99 F | DIASTOLIC BLOOD PRESSURE: 94 MMHG | HEIGHT: 67 IN | WEIGHT: 196 LBS

## 2019-09-20 DIAGNOSIS — N20.0 RENAL STONE: ICD-10-CM

## 2019-09-20 DIAGNOSIS — M54.50 ACUTE LEFT-SIDED LOW BACK PAIN WITHOUT SCIATICA: ICD-10-CM

## 2019-09-20 LAB
APPEARANCE UR: CLEAR
BILIRUB UR STRIP-MCNC: NORMAL MG/DL
COLOR UR AUTO: YELLOW
GLUCOSE UR STRIP.AUTO-MCNC: NORMAL MG/DL
KETONES UR STRIP.AUTO-MCNC: NORMAL MG/DL
LEUKOCYTE ESTERASE UR QL STRIP.AUTO: NORMAL
NITRITE UR QL STRIP.AUTO: NORMAL
PH UR STRIP.AUTO: 5.5 [PH] (ref 5–8)
PROT UR QL STRIP: NORMAL MG/DL
RBC UR QL AUTO: NORMAL
SP GR UR STRIP.AUTO: 1.02
UROBILINOGEN UR STRIP-MCNC: NORMAL MG/DL

## 2019-09-20 PROCEDURE — 81002 URINALYSIS NONAUTO W/O SCOPE: CPT | Performed by: NURSE PRACTITIONER

## 2019-09-20 PROCEDURE — 99214 OFFICE O/P EST MOD 30 MIN: CPT | Performed by: NURSE PRACTITIONER

## 2019-09-20 PROCEDURE — 74176 CT ABD & PELVIS W/O CONTRAST: CPT

## 2019-09-20 RX ORDER — TAMSULOSIN HYDROCHLORIDE 0.4 MG/1
0.4 CAPSULE ORAL
Qty: 30 CAP | Refills: 0 | Status: SHIPPED | OUTPATIENT
Start: 2019-09-20 | End: 2019-10-20

## 2019-09-20 ASSESSMENT — ENCOUNTER SYMPTOMS
RESPIRATORY NEGATIVE: 1
VOMITING: 0
BACK PAIN: 1
NEUROLOGICAL NEGATIVE: 1
CARDIOVASCULAR NEGATIVE: 1
SHORTNESS OF BREATH: 0
PERIANAL NUMBNESS: 0
BOWEL INCONTINENCE: 0
NAUSEA: 0
FEVER: 0
CHILLS: 0
ABDOMINAL PAIN: 0

## 2019-09-20 NOTE — PROGRESS NOTES
Subjective:     Maximiliano Bateman is a 64 y.o. male who presents for Back Pain (lower back pain with penis pain x 4 days )       Back Pain   This is a new problem. The problem has been gradually worsening since onset. Pertinent negatives include no abdominal pain, bladder incontinence, bowel incontinence, chest pain, dysuria, fever or perianal numbness.     Patient reports that 4 days ago he started developed left lower back pain.  He has a history of renal stones.  Today he took some tamsulosin as he was suspecting acute stone.  After that, he started to notice pain at the tip of his penis.  Denies fever, chills, abdominal pain, nausea, vomiting, urgency, frequency, dysuria, concern for STDs, chest pain, shortness of breath, or other symptoms.    PMH:  has a past medical history of Anesthesia, Dyslipidemia, GERD (gastroesophageal reflux disease), Hiatus hernia syndrome, High cholesterol, Hypercholesteremia (3/1/2011), Hyperlipidemia, Hypertension, Nonspecific elevation of levels of transaminase or lactic acid dehydrogenase (LDH) (3/11/2010), and Renal disorder.    MEDS:   Current Outpatient Medications:   •  tamsulosin (FLOMAX) 0.4 MG capsule, Take 1 Cap by mouth ONE-HALF HOUR AFTER BREAKFAST for 30 days., Disp: 30 Cap, Rfl: 0  •  losartan-hydrochlorothiazide (HYZAAR) 50-12.5 MG per tablet, TAKE 1 TABLET EVERY DAY, Disp: 90 Tab, Rfl: 2  •  atorvastatin (LIPITOR) 80 MG tablet, Take 1 Tab by mouth every bedtime., Disp: 90 Tab, Rfl: 3  •  pantoprazole (PROTONIX) 40 MG Tablet Delayed Response, Take 1 Tab by mouth every day., Disp: 90 Tab, Rfl: 3  •  phenazopyridine (PYRIDIUM) 200 MG Tab, Take 1 Tab by mouth 3 times a day as needed (dysuria)., Disp: 15 Tab, Rfl: 0  •  tramadol (ULTRAM) 50 MG Tab, Take 1 Tab by mouth every 6 hours as needed for Moderate Pain., Disp: 14 Tab, Rfl: 0  •  Cholecalciferol (VITAMIN D-3 PO), Take 1 Tab by mouth every day., Disp: , Rfl:   •  coenzyme Q-10 30 MG capsule, Take 30 mg by mouth  every day., Disp: , Rfl:   •  ibuprofen (MOTRIN) 200 MG Tab, Take 400-600 mg by mouth every 6 hours as needed for Mild Pain., Disp: , Rfl:   •  aspirin 81 MG EC tablet, Take 81 mg by mouth every evening., Disp: , Rfl:     ALLERGIES:   Allergies   Allergen Reactions   • Norco [Hydrocodone-Acetaminophen] Unspecified     Strange dreams   RXN=8/2016     SURGHX:   Past Surgical History:   Procedure Laterality Date   • CYSTOSCOPY  9/28/2016    Procedure: CYSTOSCOPY;  Surgeon: Karson Mcclendon M.D.;  Location: [facility];  Service:    • URETEROSCOPY  9/28/2016    Procedure: URETEROSCOPY;  Surgeon: Karson Mcclendon M.D.;  Location: [facility];  Service:    • LASERTRIPSY  9/28/2016    Procedure: LASER LITHOTRIPSY;  Surgeon: Karson Mcclendon M.D.;  Location: [facility];  Service:    • CYSTOSCOPY Left 9/7/2016    Procedure: CYSTOSCOPY;  Surgeon: Karson Mcclendon M.D.;  Location: [facility];  Service:    • URETEROSCOPY  9/7/2016    Procedure: URETEROSCOPY; stent placement;  Surgeon: Karson Mcclendon M.D.;  Location: [facility];  Service:    • KNEE ARTHROSCOPY  2/11/2010    Performed by MIGUEL RICO at [facility]   • COLONOSCOPY  2006    normal   • VASECTOMY       SOCHX:  reports that he has never smoked. He has never used smokeless tobacco. He reports that he does not drink alcohol or use drugs.     FH: Reviewed with patient, not pertinent to this visit.    Review of Systems   Constitutional: Positive for malaise/fatigue. Negative for chills and fever.   Respiratory: Negative.  Negative for shortness of breath.    Cardiovascular: Negative.  Negative for chest pain.   Gastrointestinal: Negative for abdominal pain, bowel incontinence, nausea and vomiting.   Genitourinary: Negative for bladder incontinence, dysuria, frequency and urgency.        Penile pain   Musculoskeletal: Positive for back pain.   Neurological: Negative.    All other systems reviewed  "and are negative.    Objective:     /94 (BP Location: Left arm, Patient Position: Sitting, BP Cuff Size: Adult)   Pulse 64   Temp 37.2 °C (99 °F) (Temporal)   Resp 12   Ht 1.702 m (5' 7\")   Wt 88.9 kg (196 lb)   SpO2 95%   BMI 30.70 kg/m²     Physical Exam   Constitutional: He is oriented to person, place, and time. He appears well-developed and well-nourished. He is cooperative.  Non-toxic appearance. No distress.   HENT:   Head: Normocephalic.   Right Ear: External ear normal.   Left Ear: External ear normal.   Nose: Nose normal.   Mouth/Throat: Mucous membranes are normal.   Eyes: Conjunctivae and EOM are normal.   Neck: Normal range of motion.   Cardiovascular: Normal rate, regular rhythm, normal heart sounds and normal pulses.   Pulmonary/Chest: Effort normal and breath sounds normal. No respiratory distress. He has no decreased breath sounds.   Abdominal: Soft. Bowel sounds are normal. He exhibits no distension. There is no tenderness. There is no CVA tenderness.   Musculoskeletal: Normal range of motion. He exhibits no deformity.        Lumbar back: He exhibits pain. He exhibits normal range of motion, no tenderness, no swelling, no deformity and no laceration.   Neurological: He is alert and oriented to person, place, and time. He has normal strength. No sensory deficit. Coordination normal.   Skin: Skin is warm and dry. Capillary refill takes less than 2 seconds. He is not diaphoretic. No pallor.   Psychiatric: He has a normal mood and affect. His behavior is normal.   Vitals reviewed.    UA:     Ref Range & Units 1:04 PM   POC Color Negative Yellow    POC Appearance Negative clear    POC Leukocyte Esterase Negative neg    POC Nitrites Negative neg    POC Urobiligen Negative (0.2) mg/dL neg    POC Protein Negative mg/dL neg    POC Urine PH 5.0 - 8.0 5.5    POC Blood Negative large    POC Specific Gravity <1.005 - >1.030 1.020    POC Ketones Negative mg/dL neg    POC Bilirubin Negative mg/dL neg "    POC Glucose Negative mg/dL neg          Specimen Collected: 09/20/19  1:04 PM   Last Resulted: 09/20/19  1:50 PM        CT renal colic:    Details     Reading Physician Reading Date Result Priority   Ming Swan M.D. 9/20/2019 Urgent Care      Narrative       9/20/2019 3:45 PM    HISTORY/REASON FOR EXAM:  Left lower back pain, penile pain, hx of renal stones.  LEFT flank pain.    TECHNIQUE/EXAM DESCRIPTION AND NUMBER OF VIEWS:  CT scan renal/colic without contrast.    5 mm helical images of the abdomen and pelvis were obtained from the diaphragmatic domes through the pubic symphysis.    Low dose optimization technique was utilized for this CT exam including automated exposure control and adjustment of the mA and/or kV according to patient size.    COMPARISON: 5/11/2019    FINDINGS:  Coronary artery calcifications.  Liver shows diffuse low-attenuation.  The spleen is unremarkable.  Adrenal glands are unremarkable.  The pancreas is unremarkable.  The gallbladder is unremarkable.  RIGHT kidney shows punctate nonobstructing stones measuring less than 2 mm.  Low-attenuation lesion posteriorly measuring 8 mm, likely cyst.  LEFT kidney shows minimally prominent collecting system.  Low-density parenchymal lesions measuring up to 14 mm.  Bladder shows small calcific density dependently.  LEFT urinary is minimally prominent.  Appendix has a normal appearance.  No peritoneal fluid or pneumoperitoneum.  Moderate atherosclerotic calcification of abdominal aorta.  Small fat-containing LEFT inguinal hernia.  Degenerative change of lumbar spine.      Impression       1.  Minimally dilated LEFT renal collecting system without obstructing ureteral stone.  2.  Small stone present in the bladder, likely recently passed.  3.  Punctate nonobstructing RIGHT kidney stones.  4.  Probable bilateral kidney cysts.  Mass is not excluded on noncontrast study.  5.  Fatty infiltration of liver.  6.  Small fat-containing LEFT inguinal hernia.              Last Resulted: 09/20/19  4:09 PM           Assessment/Plan:     1. Acute left-sided low back pain without sciatica  - POCT Urinalysis  - CT-RENAL COLIC EVALUATION(A/P W/O); Future    2. Renal stone  - STONE ANALYSIS  - tamsulosin (FLOMAX) 0.4 MG capsule; Take 1 Cap by mouth ONE-HALF HOUR AFTER BREAKFAST for 30 days.  Dispense: 30 Cap; Refill: 0    UA positive for large blood.  Negative nitrites or LE.    CT renal colic ordered.  Radiologist report reviewed.  Small stone present in bladder which has likely recently passed.  Patient continues to have nonobstructing right kidney stones.    Rx as above for tamsulosin.  Advised to increase fluids.  May continue with NSAIDs as needed for pain.  Advised to strain urine and return captured stone to the laboratory for analysis.    Patient advised close monitoring and to: Return for 1) Symptoms don't improve or worsen, or go to ER, 2) Follow up with primary care in 7-10 days.    Differential diagnosis, natural history, supportive care, and indications for immediate follow-up discussed. All questions answered. Patient agrees with the plan of care.

## 2019-12-09 DIAGNOSIS — I10 ESSENTIAL HYPERTENSION: ICD-10-CM

## 2019-12-09 DIAGNOSIS — E34.9 HYPOTESTOSTERONISM: ICD-10-CM

## 2019-12-09 DIAGNOSIS — Z12.5 PROSTATE CANCER SCREENING: ICD-10-CM

## 2019-12-09 DIAGNOSIS — K21.00 GASTROESOPHAGEAL REFLUX DISEASE WITH ESOPHAGITIS: ICD-10-CM

## 2019-12-09 DIAGNOSIS — E78.00 HYPERCHOLESTEREMIA: Chronic | ICD-10-CM

## 2019-12-09 DIAGNOSIS — Z00.00 HEALTH CARE MAINTENANCE: ICD-10-CM

## 2019-12-09 DIAGNOSIS — E55.9 VITAMIN D DEFICIENCY: ICD-10-CM

## 2019-12-10 RX ORDER — PANTOPRAZOLE SODIUM 40 MG/1
TABLET, DELAYED RELEASE ORAL
Qty: 90 TAB | Refills: 1 | Status: SHIPPED | OUTPATIENT
Start: 2019-12-10

## 2019-12-10 NOTE — TELEPHONE ENCOUNTER
Was the patient seen in the last year in this department? No  LAST SEEN 10/16/2018    Does patient have an active prescription for medications requested? No     Received Request Via: Pharmacy

## 2019-12-31 DIAGNOSIS — E78.00 HYPERCHOLESTEREMIA: Chronic | ICD-10-CM

## 2019-12-31 DIAGNOSIS — Z91.89 OTHER SPECIFIED PERSONAL RISK FACTORS, NOT ELSEWHERE CLASSIFIED: ICD-10-CM

## 2020-01-17 DIAGNOSIS — E78.2 MIXED HYPERLIPIDEMIA: ICD-10-CM

## 2020-01-17 DIAGNOSIS — E78.00 HYPERCHOLESTEREMIA: Chronic | ICD-10-CM

## 2020-01-17 LAB
25(OH)D3+25(OH)D2 SERPL-MCNC: 27.6 NG/ML (ref 30–100)
ALBUMIN SERPL-MCNC: 4.5 G/DL (ref 3.6–4.8)
ALBUMIN/GLOB SERPL: 1.5 {RATIO} (ref 1.2–2.2)
ALP SERPL-CCNC: 73 IU/L (ref 39–117)
ALT SERPL-CCNC: 34 IU/L (ref 0–44)
APPEARANCE UR: CLEAR
AST SERPL-CCNC: 18 IU/L (ref 0–40)
BACTERIA #/AREA URNS HPF: ABNORMAL /[HPF]
BASOPHILS # BLD AUTO: 0 X10E3/UL (ref 0–0.2)
BASOPHILS NFR BLD AUTO: 0 %
BILIRUB SERPL-MCNC: 0.5 MG/DL (ref 0–1.2)
BILIRUB UR QL STRIP: NEGATIVE
BUN SERPL-MCNC: 22 MG/DL (ref 8–27)
BUN/CREAT SERPL: 18 (ref 10–24)
CALCIUM SERPL-MCNC: 9.8 MG/DL (ref 8.6–10.2)
CASTS URNS MICRO: ABNORMAL
CASTS URNS QL MICRO: ABNORMAL
CHLORIDE SERPL-SCNC: 101 MMOL/L (ref 96–106)
CHOLEST SERPL-MCNC: 280 MG/DL (ref 100–199)
CO2 SERPL-SCNC: 22 MMOL/L (ref 20–29)
COLOR UR: YELLOW
CREAT SERPL-MCNC: 1.23 MG/DL (ref 0.76–1.27)
CRYSTALS URNS MICRO: ABNORMAL
EOSINOPHIL # BLD AUTO: 0.1 X10E3/UL (ref 0–0.4)
EOSINOPHIL NFR BLD AUTO: 1 %
EPI CELLS #/AREA URNS HPF: ABNORMAL /HPF (ref 0–10)
ERYTHROCYTE [DISTWIDTH] IN BLOOD BY AUTOMATED COUNT: 13.2 % (ref 11.6–15.4)
GLOBULIN SER CALC-MCNC: 3 G/DL (ref 1.5–4.5)
GLUCOSE SERPL-MCNC: 92 MG/DL (ref 65–99)
GLUCOSE UR QL: NEGATIVE
HCT VFR BLD AUTO: 49.2 % (ref 37.5–51)
HDLC SERPL-MCNC: 56 MG/DL
HGB BLD-MCNC: 16.5 G/DL (ref 13–17.7)
HGB UR QL STRIP: NEGATIVE
IMM GRANULOCYTES # BLD AUTO: 0 X10E3/UL (ref 0–0.1)
IMM GRANULOCYTES NFR BLD AUTO: 0 %
IMMATURE CELLS  115398: ABNORMAL
KETONES UR QL STRIP: NEGATIVE
LABORATORY COMMENT REPORT: ABNORMAL
LDLC SERPL CALC-MCNC: 200 MG/DL (ref 0–99)
LEUKOCYTE ESTERASE UR QL STRIP: NEGATIVE
LYMPHOCYTES # BLD AUTO: 2.8 X10E3/UL (ref 0.7–3.1)
LYMPHOCYTES NFR BLD AUTO: 23 %
MCH RBC QN AUTO: 29.5 PG (ref 26.6–33)
MCHC RBC AUTO-ENTMCNC: 33.5 G/DL (ref 31.5–35.7)
MCV RBC AUTO: 88 FL (ref 79–97)
MICRO URNS: NORMAL
MICRO URNS: NORMAL
MONOCYTES # BLD AUTO: 1.3 X10E3/UL (ref 0.1–0.9)
MONOCYTES NFR BLD AUTO: 11 %
MORPHOLOGY BLD-IMP: ABNORMAL
MUCOUS THREADS URNS QL MICRO: PRESENT
NEUTROPHILS # BLD AUTO: 7.6 X10E3/UL (ref 1.4–7)
NEUTROPHILS NFR BLD AUTO: 65 %
NITRITE UR QL STRIP: NEGATIVE
NRBC BLD AUTO-RTO: ABNORMAL %
PH UR STRIP: 5.5 [PH] (ref 5–7.5)
PLATELET # BLD AUTO: 287 X10E3/UL (ref 150–450)
POTASSIUM SERPL-SCNC: 4.3 MMOL/L (ref 3.5–5.2)
PROT SERPL-MCNC: 7.5 G/DL (ref 6–8.5)
PROT UR QL STRIP: NEGATIVE
PSA SERPL-MCNC: 0.8 NG/ML (ref 0–4)
RBC # BLD AUTO: 5.59 X10E6/UL (ref 4.14–5.8)
RBC #/AREA URNS HPF: ABNORMAL /HPF (ref 0–2)
RENAL EPI CELLS #/AREA URNS HPF: ABNORMAL /[HPF]
SODIUM SERPL-SCNC: 138 MMOL/L (ref 134–144)
SP GR UR: 1.01 (ref 1–1.03)
T VAGINALIS URNS QL MICRO: ABNORMAL
TESTOST SERPL-MCNC: 314 NG/DL (ref 264–916)
TRIGL SERPL-MCNC: 120 MG/DL (ref 0–149)
UNIDENT CRYS URNS QL MICRO: ABNORMAL
URINALYSIS REFLEX  377202: NORMAL
URNS CMNT MICRO: ABNORMAL
UROBILINOGEN UR STRIP-MCNC: 0.2 MG/DL (ref 0.2–1)
VLDLC SERPL CALC-MCNC: 24 MG/DL (ref 5–40)
WBC # BLD AUTO: 11.9 X10E3/UL (ref 3.4–10.8)
WBC #/AREA URNS HPF: ABNORMAL /HPF (ref 0–5)
YEAST #/AREA URNS HPF: ABNORMAL /[HPF]

## 2020-01-17 RX ORDER — EZETIMIBE 10 MG/1
10 TABLET ORAL DAILY
Qty: 90 TAB | Refills: 3 | Status: SHIPPED
Start: 2020-01-17 | End: 2020-02-05

## 2020-01-30 ENCOUNTER — HOSPITAL ENCOUNTER (OUTPATIENT)
Facility: MEDICAL CENTER | Age: 65
End: 2020-01-30
Attending: NURSE PRACTITIONER
Payer: COMMERCIAL

## 2020-01-30 PROCEDURE — 82365 CALCULUS SPECTROSCOPY: CPT

## 2020-02-04 LAB
APPEARANCE STONE: NORMAL
COMPN STONE: NORMAL
NUMBER STONE: 4
SIZE STONE: NORMAL MM
SPECIMEN WT: 35 MG

## 2020-02-05 ENCOUNTER — OFFICE VISIT (OUTPATIENT)
Dept: MEDICAL GROUP | Facility: PHYSICIAN GROUP | Age: 65
End: 2020-02-05
Payer: COMMERCIAL

## 2020-02-05 VITALS
HEIGHT: 67 IN | RESPIRATION RATE: 16 BRPM | BODY MASS INDEX: 31.08 KG/M2 | TEMPERATURE: 97.5 F | WEIGHT: 198 LBS | OXYGEN SATURATION: 95 % | DIASTOLIC BLOOD PRESSURE: 74 MMHG | SYSTOLIC BLOOD PRESSURE: 120 MMHG | HEART RATE: 64 BPM

## 2020-02-05 DIAGNOSIS — Z00.00 HEALTHCARE MAINTENANCE: Primary | ICD-10-CM

## 2020-02-05 DIAGNOSIS — E78.00 HYPERCHOLESTEREMIA: Chronic | ICD-10-CM

## 2020-02-05 DIAGNOSIS — Z12.11 COLON CANCER SCREENING: ICD-10-CM

## 2020-02-05 DIAGNOSIS — E78.2 MIXED HYPERLIPIDEMIA: ICD-10-CM

## 2020-02-05 PROCEDURE — 99396 PREV VISIT EST AGE 40-64: CPT | Performed by: FAMILY MEDICINE

## 2020-02-05 RX ORDER — ROSUVASTATIN CALCIUM 10 MG/1
10 TABLET, COATED ORAL
Qty: 90 TAB | Refills: 3 | Status: SHIPPED | OUTPATIENT
Start: 2020-02-05 | End: 2020-05-29

## 2020-02-05 ASSESSMENT — PATIENT HEALTH QUESTIONNAIRE - PHQ9: CLINICAL INTERPRETATION OF PHQ2 SCORE: 0

## 2020-02-06 NOTE — PROGRESS NOTES
The patient comes in for annual exam.  He feels well.  He has not been taking the atorvastatin which I prescribed for his elevated cholesterol because he was worried about side effects.  He says he did not actually have any side effects but he did not like what he read about them.  He has had unacceptable lipid levels for several years.  I do believe he is tried through dietary measures to restrict his intake of fats.  We discussed this at some length.  I am going to get him to try Crestor 10 mg Monday Wednesday and Friday in the evenings and we will get a repeat CMP and lipid profile in 2 months.      Review of Systems   Constitutional: Negative.  Negative for fever, chills, weight loss and malaise/fatigue.   HENT: Negative for hearing loss, ear pain, congestion, sore throat, neck pain and tinnitus.    Eyes: Negative for blurred vision, double vision and pain.   Respiratory: Negative for cough, hemoptysis, shortness of breath and wheezing.    Cardiovascular: Negative for chest pain, palpitations, orthopnea, claudication, leg swelling and PND.   Gastrointestinal: Negative for heartburn, nausea, vomiting, abdominal pain, diarrhea, constipation, blood in stool and melena.   Genitourinary: Negative for incontinence, dysuria, urgency, frequency and hematuria.  Male--negative for erectile dysfunction  Musculoskeletal: Negative for myalgias, back pain and joint pain.   Skin: Negative for rash and itching. No lesions that will not heal.  Neurological: Negative for dizziness, tingling, tremors, focal weakness, seizures, loss of consciousness and headaches.   Endo/Heme/Allergies: Negative for environmental allergies and polydipsia.  Does not bruise/bleed easily.   Psychiatric/Behavioral: Negative for depression, memory loss and substance abuse.  The patient is not nervous/anxious and does not have insomnia.  All others negative.      I reviewed the following    Past Medical History:   Diagnosis Date   • Anesthesia     slow  to wake after last surgery   • Dyslipidemia    • GERD (gastroesophageal reflux disease)    • Hiatus hernia syndrome    • High cholesterol    • Hypercholesteremia 3/1/2011   • Hyperlipidemia    • Hypertension    • Nonspecific elevation of levels of transaminase or lactic acid dehydrogenase (LDH) 3/11/2010   • Renal disorder     kidney stones        Past Surgical History:   Procedure Laterality Date   • CYSTOSCOPY  9/28/2016    Procedure: CYSTOSCOPY;  Surgeon: Karson Mcclendon M.D.;  Location: [facility]ERY [facility];  Service:    • URETEROSCOPY  9/28/2016    Procedure: URETEROSCOPY;  Surgeon: Karson Mcclendon M.D.;  Location: SURGERY [facility];  Service:    • LASERTRIPSY  9/28/2016    Procedure: LASER LITHOTRIPSY;  Surgeon: Karson Mcclendon M.D.;  Location: SURGERY [facility];  Service:    • CYSTOSCOPY Left 9/7/2016    Procedure: CYSTOSCOPY;  Surgeon: Karson Mcclendon M.D.;  Location: SURGERY [facility];  Service:    • URETEROSCOPY  9/7/2016    Procedure: URETEROSCOPY; stent placement;  Surgeon: Kasron Mcclendon M.D.;  Location: SURGERY [facility];  Service:    • KNEE ARTHROSCOPY  2/11/2010    Performed by MIGUEL RICO at [facility]   • COLONOSCOPY  2006    normal   • VASECTOMY         Allergies   Allergen Reactions   • Norco [Hydrocodone-Acetaminophen] Unspecified     Strange dreams   RXN=8/2016       Current Outpatient Medications   Medication Sig Dispense Refill   • rosuvastatin (CRESTOR) 10 MG Tab Take 1 Tab by mouth every Monday, Wednesday, and Friday. Take in the evening 90 Tab 3   • pantoprazole (PROTONIX) 40 MG Tablet Delayed Response TAKE 1 TABLET EVERY DAY 90 Tab 1   • losartan-hydrochlorothiazide (HYZAAR) 50-12.5 MG per tablet TAKE 1 TABLET EVERY DAY 90 Tab 2   • ibuprofen (MOTRIN) 200 MG Tab Take 400-600 mg by mouth every 6 hours as needed for Mild Pain.     • aspirin 81 MG EC tablet Take 81 mg by mouth every evening.     • phenazopyridine (PYRIDIUM) 200 MG Tab Take 1 Tab  by mouth 3 times a day as needed (dysuria). (Patient not taking: Reported on 2/5/2020) 15 Tab 0   • tramadol (ULTRAM) 50 MG Tab Take 1 Tab by mouth every 6 hours as needed for Moderate Pain. (Patient not taking: Reported on 2/5/2020) 14 Tab 0   • Cholecalciferol (VITAMIN D-3 PO) Take 1 Tab by mouth every day.     • coenzyme Q-10 30 MG capsule Take 30 mg by mouth every day.       No current facility-administered medications for this visit.         Family History   Problem Relation Age of Onset   • Hypertension Mother    • Hyperlipidemia Mother    • Hypertension Father    • Hyperlipidemia Father        Social History     Socioeconomic History   • Marital status:      Spouse name: Not on file   • Number of children: Not on file   • Years of education: Not on file   • Highest education level: Not on file   Occupational History   • Not on file   Social Needs   • Financial resource strain: Not on file   • Food insecurity:     Worry: Not on file     Inability: Not on file   • Transportation needs:     Medical: Not on file     Non-medical: Not on file   Tobacco Use   • Smoking status: Never Smoker   • Smokeless tobacco: Never Used   Substance and Sexual Activity   • Alcohol use: No   • Drug use: No   • Sexual activity: Yes     Partners: Female     Birth control/protection: Post-Menopausal   Lifestyle   • Physical activity:     Days per week: Not on file     Minutes per session: Not on file   • Stress: Not on file   Relationships   • Social connections:     Talks on phone: Not on file     Gets together: Not on file     Attends Yazdanism service: Not on file     Active member of club or organization: Not on file     Attends meetings of clubs or organizations: Not on file     Relationship status: Not on file   • Intimate partner violence:     Fear of current or ex partner: Not on file     Emotionally abused: Not on file     Physically abused: Not on file     Forced sexual activity: Not on file   Other Topics Concern   •  Not on file   Social History Narrative   • Not on file      Hospital Outpatient Visit on 01/30/2020   Component Date Value   • Calculi Mass 01/30/2020 35    • Calculi Number 01/30/2020 4    • Size 01/30/2020 Various    • Calculi Description 01/30/2020 See Note    • Calculi Composition 01/30/2020 See Note    Orders Only on 01/16/2020   Component Date Value   • WBC 01/16/2020 11.9*   • RBC 01/16/2020 5.59    • Hemoglobin 01/16/2020 16.5    • Hematocrit 01/16/2020 49.2    • MCV 01/16/2020 88    • MCH 01/16/2020 29.5    • MCHC 01/16/2020 33.5    • RDW 01/16/2020 13.2    • Platelet Count 01/16/2020 287    • Neutrophils-Polys 01/16/2020 65    • Lymphocytes 01/16/2020 23    • Monocytes 01/16/2020 11    • Eosinophils 01/16/2020 1    • Basophils 01/16/2020 0    • Immature Cells 01/16/2020 CANCELED    • Neutrophils (Absolute) 01/16/2020 7.6*   • Lymphs (Absolute) 01/16/2020 2.8    • Monos (Absolute) 01/16/2020 1.3*   • Eos (Absolute) 01/16/2020 0.1    • Baso (Absolute) 01/16/2020 0.0    • Immature Granulocytes 01/16/2020 0    • Immature Granulocytes (a* 01/16/2020 0.0    • Nucleated RBC 01/16/2020 CANCELED    • Comments-Diff 01/16/2020 CANCELED    • Glucose 01/16/2020 92    • Bun 01/16/2020 22    • Creatinine 01/16/2020 1.23    • GFR If Non  Ameri* 01/16/2020 62    • GFR If  01/16/2020 72    • Bun-Creatinine Ratio 01/16/2020 18    • Sodium 01/16/2020 138    • Potassium 01/16/2020 4.3    • Chloride 01/16/2020 101    • Co2 01/16/2020 22    • Calcium 01/16/2020 9.8    • Total Protein 01/16/2020 7.5    • Albumin 01/16/2020 4.5    • Globulin 01/16/2020 3.0    • A-G Ratio 01/16/2020 1.5    • Total Bilirubin 01/16/2020 0.5    • Alkaline Phosphatase 01/16/2020 73    • AST(SGOT) 01/16/2020 18    • ALT(SGPT) 01/16/2020 34    • Specific Gravity 01/16/2020 1.013    • Ph 01/16/2020 5.5    • Color 01/16/2020 Yellow    • Character 01/16/2020 Clear    • Leukocyte Esterase 01/16/2020 Negative    • Protein 01/16/2020  Negative    • Glucose 01/16/2020 Negative    • Ketones 01/16/2020 Negative    • Occult Blood 01/16/2020 Negative    • Bilirubin 01/16/2020 Negative    • Urobilinogen Semi Qnt 01/16/2020 0.2    • Nitrite 01/16/2020 Negative    • Microscopic Exam 01/16/2020 Comment    • Microscopic Exam 01/16/2020 See below:    • Urinalysis Reflex 01/16/2020 Comment    • WBC 01/16/2020 0-5    • RBC 01/16/2020 3-10*   • Epithelial Cells Non Jacob* 01/16/2020 None seen    • Epithelial Cells Renal 01/16/2020 CANCELED    • Urine Casts 01/16/2020 CANCELED    • Cast Type 01/16/2020 CANCELED    • Urine Crystals 01/16/2020 CANCELED    • Crystal Type 01/16/2020 CANCELED    • Mucous Threads 01/16/2020 Present    • Bacteria 01/16/2020 None seen    • Yeast Cells 01/16/2020 CANCELED    • Trichomonas 01/16/2020 CANCELED    • Comment 01/16/2020 CANCELED    • Cholesterol,Tot 01/16/2020 280*   • Triglycerides 01/16/2020 120    • HDL 01/16/2020 56    • VLDL Cholesterol Calc 01/16/2020 24    • LDL 01/16/2020 200*   • Comment: 01/16/2020 CANCELED    • Testosterone 01/16/2020 314    • Prostatic Specific Antig* 01/16/2020 0.8    • 25-Hydroxy   Vitamin D 25 01/16/2020 27.6*       Physical Exam   Nursing note and vitals reviewed.  Constitutional: He is oriented. He appears well-developed and well-nourished. He appears not diaphoretic. No distress.   Head: Normocephalic and atraumatic.   Right Ear: External ear normal. Ear canal and TM normal  Left Ear: External ear normal. Ear canal and TM normal  Nose: Nose normal.   Mouth/Throat: Oropharynx is clear and moist. No oropharyngeal exudate.   Eyes: Conjunctivae and extraocular motions are normal. Pupils are equal, round, and reactive to light. Right eye exhibits no discharge. Left eye exhibits no discharge. No scleral icterus. Fundi benign bilaterally   Neck: Normal range of motion. Neck supple. No JVD present. No tracheal deviation present. No thyromegaly present.   Cardiovascular: Normal rate, regular rhythm,  normal heart sounds and intact distal pulses.  Exam reveals no gallop and no friction rub.    No murmur heard.  Pulmonary/Chest: Effort normal and breath sounds normal. No stridor. No respiratory distress. He has no wheezes. He has no rales. He exhibits no tenderness.   Abdominal: Soft. Bowel sounds are normal. He exhibits no distension and no mass. No tenderness. No hepatosplenomegaly. He has no rebound and no guarding. Hernia confirmed negative in the right inguinal area and confirmed negative in the left inguinal area.   Genitourinary: Penis normal. Rectal exam shows no external hemorrhoid, no internal hemorrhoid, no fissure, no mass, no tenderness and anal tone normal. Guaiac negative stool. Prostate is 1+ enlarged and not tender. There are no prostate masses. Right testis shows no mass, no swelling, no tenderness. Right testis is descended. Left testis shows no mass, no swelling, no tenderness. Left testis is descended. Circumcised. No phimosis, penile erythema or penile tenderness. No discharge found.   Musculoskeletal: Normal range of motion. He exhibits no edema and no tenderness.   Lymphadenopathy:     He has no cervical adenopathy.   No supraclavicular adenopathy.       Right: No inguinal adenopathy present.        Left: No inguinal adenopathy present.   Neurological: He is alert and oriented. He displays normal reflexes. No cranial nerve deficit. He exhibits normal muscle tone. Coordination normal.   Skin: Skin is warm and dry. No rash noted. He is not diaphoretic. No erythema. No pallor.   Psychiatric: He has a normal mood and appropriate affect. His behavior is normal. Judgment and thought content normal.     1. Healthcare maintenance   patient is basically doing well.   2. Hypercholesteremia  rosuvastatin (CRESTOR) 10 MG Tab--1 p.o. Monday Wednesday and Friday evenings    Comp Metabolic Panel    Lipid Profile  These labs are to be done in 2 months   3. Mixed hyperlipidemia  rosuvastatin (CRESTOR) 10 MG  Tab    Comp Metabolic Panel    Lipid Profile   4. Colon cancer screening  OCCULT BLOOD FECES IMMUNOASSAY     Recheck with me PRN    Please note that this dictation was created using voice recognition software. I have worked with consultants from the vendor as well as technical experts from [employer] to optimize the interface. I have made every reasonable attempt to correct obvious errors, but I expect that there are errors of grammar and possibly content that I did not discover before finalizing the note.    I have enjoyed having this  in my practice.  I wish him well.

## 2020-02-12 DIAGNOSIS — N20.0 URIC ACID KIDNEY STONE: ICD-10-CM

## 2020-02-12 DIAGNOSIS — I10 ESSENTIAL HYPERTENSION: ICD-10-CM

## 2020-02-12 DIAGNOSIS — N20.1 URETERAL STONE: ICD-10-CM

## 2020-02-12 RX ORDER — LOSARTAN POTASSIUM AND HYDROCHLOROTHIAZIDE 12.5; 5 MG/1; MG/1
1 TABLET ORAL
Qty: 90 TAB | Refills: 3 | Status: SHIPPED | OUTPATIENT
Start: 2020-02-12 | End: 2021-03-12 | Stop reason: SDUPTHER

## 2020-02-12 RX ORDER — POTASSIUM CITRATE 15 MEQ/1
1 TABLET, EXTENDED RELEASE ORAL 2 TIMES DAILY
Qty: 180 TAB | Refills: 3 | Status: SHIPPED | OUTPATIENT
Start: 2020-02-12 | End: 2022-04-21

## 2020-02-21 ENCOUNTER — HOSPITAL ENCOUNTER (OUTPATIENT)
Facility: MEDICAL CENTER | Age: 65
End: 2020-02-21
Attending: FAMILY MEDICINE
Payer: COMMERCIAL

## 2020-02-21 ENCOUNTER — HOSPITAL ENCOUNTER (OUTPATIENT)
Dept: RADIOLOGY | Facility: MEDICAL CENTER | Age: 65
End: 2020-02-21
Attending: FAMILY MEDICINE
Payer: COMMERCIAL

## 2020-02-21 DIAGNOSIS — Z91.89 OTHER SPECIFIED PERSONAL RISK FACTORS, NOT ELSEWHERE CLASSIFIED: ICD-10-CM

## 2020-02-21 DIAGNOSIS — E78.00 HYPERCHOLESTEREMIA: ICD-10-CM

## 2020-02-21 PROCEDURE — 4410556 CT-CARDIAC SCORING

## 2020-02-21 PROCEDURE — 82274 ASSAY TEST FOR BLOOD FECAL: CPT

## 2020-02-23 DIAGNOSIS — Z12.11 COLON CANCER SCREENING: ICD-10-CM

## 2020-02-24 DIAGNOSIS — K21.00 GASTROESOPHAGEAL REFLUX DISEASE WITH ESOPHAGITIS: ICD-10-CM

## 2020-02-24 DIAGNOSIS — E78.00 HYPERCHOLESTEREMIA: Chronic | ICD-10-CM

## 2020-02-24 DIAGNOSIS — R93.1 AGATSTON CORONARY ARTERY CALCIUM SCORE GREATER THAN 400: ICD-10-CM

## 2020-02-24 DIAGNOSIS — R19.5 POSITIVE OCCULT STOOL BLOOD TEST: ICD-10-CM

## 2020-02-24 LAB — HEMOCCULT STL QL IA: POSITIVE

## 2020-05-29 ENCOUNTER — OFFICE VISIT (OUTPATIENT)
Dept: CARDIOLOGY | Facility: MEDICAL CENTER | Age: 65
End: 2020-05-29
Payer: COMMERCIAL

## 2020-05-29 ENCOUNTER — TELEPHONE (OUTPATIENT)
Dept: CARDIOLOGY | Facility: MEDICAL CENTER | Age: 65
End: 2020-05-29

## 2020-05-29 VITALS
SYSTOLIC BLOOD PRESSURE: 124 MMHG | WEIGHT: 191 LBS | BODY MASS INDEX: 29.98 KG/M2 | DIASTOLIC BLOOD PRESSURE: 82 MMHG | OXYGEN SATURATION: 95 % | HEIGHT: 67 IN | RESPIRATION RATE: 14 BRPM | HEART RATE: 82 BPM

## 2020-05-29 DIAGNOSIS — Z00.00 HEALTHCARE MAINTENANCE: ICD-10-CM

## 2020-05-29 DIAGNOSIS — E78.00 HYPERCHOLESTEREMIA: Chronic | ICD-10-CM

## 2020-05-29 DIAGNOSIS — R93.1 AGATSTON CORONARY ARTERY CALCIUM SCORE GREATER THAN 400: ICD-10-CM

## 2020-05-29 DIAGNOSIS — I10 ESSENTIAL HYPERTENSION: ICD-10-CM

## 2020-05-29 PROCEDURE — 93000 ELECTROCARDIOGRAM COMPLETE: CPT | Performed by: INTERNAL MEDICINE

## 2020-05-29 PROCEDURE — 99205 OFFICE O/P NEW HI 60 MIN: CPT | Performed by: INTERNAL MEDICINE

## 2020-05-29 RX ORDER — ROSUVASTATIN CALCIUM 20 MG/1
20 TABLET, COATED ORAL EVERY EVENING
Qty: 30 TAB | Refills: 11 | Status: SHIPPED | OUTPATIENT
Start: 2020-05-29 | End: 2022-04-21

## 2020-05-29 RX ORDER — LOSARTAN POTASSIUM 50 MG/1
50 TABLET ORAL
COMMUNITY
Start: 2020-05-18 | End: 2022-04-21

## 2020-05-29 RX ORDER — HYDROCHLOROTHIAZIDE 12.5 MG/1
12.5 CAPSULE, GELATIN COATED ORAL
COMMUNITY
Start: 2020-05-18 | End: 2022-04-21

## 2020-05-29 ASSESSMENT — ENCOUNTER SYMPTOMS
PND: 0
CONSTIPATION: 0
IRREGULAR HEARTBEAT: 0
DYSPNEA ON EXERTION: 0
DEPRESSION: 0
COUGH: 0
NAUSEA: 0
ABDOMINAL PAIN: 0
DIARRHEA: 0
ORTHOPNEA: 0
SHORTNESS OF BREATH: 0
FLANK PAIN: 0
WEIGHT LOSS: 0
BLURRED VISION: 0
VOMITING: 0
NEAR-SYNCOPE: 0
ALTERED MENTAL STATUS: 0
DIZZINESS: 0
CLAUDICATION: 0
PALPITATIONS: 0
WEIGHT GAIN: 0
SYNCOPE: 0
BACK PAIN: 0
FEVER: 0
HEARTBURN: 0
DECREASED APPETITE: 0

## 2020-05-29 ASSESSMENT — FIBROSIS 4 INDEX: FIB4 SCORE: 0.69

## 2020-05-29 NOTE — PATIENT INSTRUCTIONS
Mediterranean Diet  A Mediterranean diet refers to food and lifestyle choices that are based on the traditions of countries located on the [place]. This way of eating has been shown to help prevent certain conditions and improve outcomes for people who have chronic diseases, like kidney disease and heart disease.  What are tips for following this plan?  Lifestyle  · Cook and eat meals together with your family, when possible.  · Drink enough fluid to keep your urine clear or pale yellow.  · Be physically active every day. This includes:  ¨ Aerobic exercise like running or swimming.  ¨ Leisure activities like gardening, walking, or housework.  · Get 7-8 hours of sleep each night.  · If recommended by your health care provider, drink red wine in moderation. This means 1 glass a day for nonpregnant women and 2 glasses a day for men. A glass of wine equals 5 oz (150 mL).  Reading food labels  · Check the serving size of packaged foods. For foods such as rice and pasta, the serving size refers to the amount of cooked product, not dry.  · Check the total fat in packaged foods. Avoid foods that have saturated fat or trans fats.  · Check the ingredients list for added sugars, such as corn syrup.  Shopping  · At the grocery store, buy most of your food from the areas near the walls of the store. This includes:  ¨ Fresh fruits and vegetables (produce).  ¨ Grains, beans, nuts, and seeds. Some of these may be available in unpackaged forms or large amounts (in bulk).  ¨ Fresh seafood.  ¨ Poultry and eggs.  ¨ Low-fat dairy products.  · Buy whole ingredients instead of prepackaged foods.  · Buy fresh fruits and vegetables in-season from local farmers markets.  · Buy frozen fruits and vegetables in resealable bags.  · If you do not have access to quality fresh seafood, buy precooked frozen shrimp or canned fish, such as tuna, salmon, or sardines.  · Buy small amounts of raw or cooked vegetables, salads, or olives from  the deli or salad bar at your store.  · Stock your pantry so you always have certain foods on hand, such as olive oil, canned tuna, canned tomatoes, rice, pasta, and beans.  Cooking  · Cook foods with extra-virgin olive oil instead of using butter or other vegetable oils.  · Have meat as a side dish, and have vegetables or grains as your main dish. This means having meat in small portions or adding small amounts of meat to foods like pasta or stew.  · Use beans or vegetables instead of meat in common dishes like chili or lasagna.  · [place] with different cooking methods. Try roasting or broiling vegetables instead of steaming or sautéeing them.  · Add frozen vegetables to soups, stews, pasta, or rice.  · Add nuts or seeds for added healthy fat at each meal. You can add these to yogurt, salads, or vegetable dishes.  · Marinate fish or vegetables using olive oil, lemon juice, garlic, and fresh herbs.  Meal planning  · Plan to eat 1 vegetarian meal one day each week. Try to work up to 2 vegetarian meals, if possible.  · Eat seafood 2 or more times a week.  · Have healthy snacks readily available, such as:  ¨ Vegetable sticks with hummus.  ¨ Greek yogurt.  ¨ Fruit and nut trail mix.  · Eat balanced meals throughout the week. This includes:  ¨ Fruit: 2-3 servings a day  ¨ Vegetables: 4-5 servings a day  ¨ Low-fat dairy: 2 servings a day  ¨ Fish, poultry, or lean meat: 1 serving a day  ¨ Beans and legumes: 2 or more servings a week  ¨ Nuts and seeds: 1-2 servings a day  ¨ Whole grains: 6-8 servings a day  ¨ Extra-virgin olive oil: 3-4 servings a day  · Limit red meat and sweets to only a few servings a month  What are my food choices?  · Mediterranean diet  ¨ Recommended  ¨ Grains: Whole-grain pasta. Brown rice. Bulgar wheat. Polenta. Couscous. Whole-wheat bread. Oatmeal. Quinoa.  ¨ Vegetables: Artichokes. Beets. Broccoli. Cabbage. Carrots. Eggplant. Green beans. Chard. Kale. Spinach. Onions. Leeks. Peas. Squash.  Tomatoes. Peppers. Radishes.  ¨ Fruits: Apples. Apricots. Avocado. Berries. Bananas. Cherries. Dates. Figs. Grapes. Chiki. Melon. Oranges. Peaches. Plums. Pomegranate.  ¨ Meats and other protein foods: Beans. Almonds. Sunflower seeds. Pine nuts. Peanuts. Cod. [place]. Scallops. Shrimp. Tuna. Tilapia. Clams. Oysters. Eggs.  ¨ Dairy: Low-fat milk. Cheese. Greek yogurt.  ¨ Beverages: Water. Red wine. Herbal tea.  ¨ Fats and oils: Extra virgin olive oil. Avocado oil. Grape seed oil.  ¨ Sweets and desserts: Greek yogurt with honey. Baked apples. Poached pears. Trail mix.  ¨ Seasoning and other foods: Basil. Cilantro. Coriander. Cumin. Mint. Parsley. Shyam. Rosemary. Tarragon. Garlic. Oregano. Thyme. Pepper. Balsalmic vinegar. Tahini. Hummus. Tomato sauce. Olives. Mushrooms.  ¨ Limit these  ¨ Grains: Prepackaged pasta or rice dishes. Prepackaged cereal with added sugar.  ¨ Vegetables: Deep fried potatoes (french fries).  ¨ Fruits: Fruit canned in syrup.  ¨ Meats and other protein foods: Beef. Pork. Lamb. Poultry with skin. Hot dogs. [place].  ¨ Dairy: Ice cream. Sour cream. Whole milk.  ¨ Beverages: Juice. Sugar-sweetened soft drinks. Beer. Liquor and spirits.  ¨ Fats and oils: Butter. Canola oil. Vegetable oil. Beef fat (tallow). Lard.  ¨ Sweets and desserts: Cookies. Cakes. Pies. Candy.  ¨ Seasoning and other foods: Mayonnaise. Premade sauces and marinades.  ¨ The items listed may not be a complete list. Talk with your dietitian about what dietary choices are right for you.  Summary  · The Mediterranean diet includes both food and lifestyle choices.  · Eat a variety of fresh fruits and vegetables, beans, nuts, seeds, and whole grains.  · Limit the amount of red meat and sweets that you eat.  · Talk with your health care provider about whether it is safe for you to drink red wine in moderation. This means 1 glass a day for nonpregnant women and 2 glasses a day for men. A glass of wine equals 5 oz (150 mL).  This information  is not intended to replace advice given to you by your health care provider. Make sure you discuss any questions you have with your health care provider.  Document Released: 08/10/2017 Document Revised: 09/12/2017 Document Reviewed: 08/10/2017  Elsevier Interactive Patient Education © 2017 [employer]

## 2020-05-29 NOTE — PROGRESS NOTES
Cardiology Note    Elevated agatston score    History of Present Illness: Maximiliano Bateman is a 65 y.o. male who presents for initial visit.    Very pleasant. Denies cardiac complaints. Presents for further management of elevated agatston score. Stays active at work. [place]. Has had longstanding HLD and HTN for which he self discontinued statin due to friend having myositis but he did continue antihypertensives. Himself, did not have symptoms with statin.    Review of Systems   Constitution: Negative for decreased appetite, fever, malaise/fatigue, weight gain and weight loss.   HENT: Negative for congestion and nosebleeds.    Eyes: Negative for blurred vision.   Cardiovascular: Negative for chest pain, claudication, dyspnea on exertion, irregular heartbeat, leg swelling, near-syncope, orthopnea, palpitations, paroxysmal nocturnal dyspnea and syncope.   Respiratory: Negative for cough and shortness of breath.    Endocrine: Negative for cold intolerance and heat intolerance.   Skin: Negative for rash.   Musculoskeletal: Negative for back pain.   Gastrointestinal: Negative for abdominal pain, constipation, diarrhea, heartburn, melena, nausea and vomiting.   Genitourinary: Negative for dysuria, flank pain and hematuria.   Neurological: Negative for dizziness.   Psychiatric/Behavioral: Negative for altered mental status and depression.         Past Medical History:   Diagnosis Date   • Anesthesia     slow to wake after last surgery   • Dyslipidemia    • GERD (gastroesophageal reflux disease)    • Hiatus hernia syndrome    • High cholesterol    • Hypercholesteremia 3/1/2011   • Hyperlipidemia    • Hypertension    • Nonspecific elevation of levels of transaminase or lactic acid dehydrogenase (LDH) 3/11/2010   • Renal disorder     kidney stones         Past Surgical History:   Procedure Laterality Date   • CYSTOSCOPY  9/28/2016    Procedure: CYSTOSCOPY;  Surgeon: Karson Mcclendon M.D.;   Location: SURGERY [facility];  Service:    • URETEROSCOPY  9/28/2016    Procedure: URETEROSCOPY;  Surgeon: Karson Mcclendon M.D.;  Location: SURGERY [facility];  Service:    • LASERTRIPSY  9/28/2016    Procedure: LASER LITHOTRIPSY;  Surgeon: Karson Mcclendon M.D.;  Location: SURGERY [facility];  Service:    • CYSTOSCOPY Left 9/7/2016    Procedure: CYSTOSCOPY;  Surgeon: Karson Mcclendon M.D.;  Location: SURGERY [facility];  Service:    • URETEROSCOPY  9/7/2016    Procedure: URETEROSCOPY; stent placement;  Surgeon: Karson Mccelndon M.D.;  Location: SURGERY [facility];  Service:    • KNEE ARTHROSCOPY  2/11/2010    Performed by MIGUEL RICO at [facility]   • COLONOSCOPY  2006    normal   • VASECTOMY           Current Outpatient Medications   Medication Sig Dispense Refill   • Ascorbic Acid (VITAMIN C PO) Take  by mouth.     • MAGNESIUM PO Take  by mouth.     • rosuvastatin (CRESTOR) 20 MG Tab Take 1 Tab by mouth every evening. 30 Tab 11   • Potassium Citrate 15 MEQ (1620 MG) Tab CR Take 1 Tab by mouth 2 Times a Day. 180 Tab 3   • losartan-hydrochlorothiazide (HYZAAR) 50-12.5 MG per tablet Take 1 Tab by mouth every day. 90 Tab 3   • pantoprazole (PROTONIX) 40 MG Tablet Delayed Response TAKE 1 TABLET EVERY DAY 90 Tab 1   • Cholecalciferol (VITAMIN D-3 PO) Take 1 Tab by mouth every day.     • coenzyme Q-10 30 MG capsule Take 30 mg by mouth every day.     • ibuprofen (MOTRIN) 200 MG Tab Take 400-600 mg by mouth every 6 hours as needed for Mild Pain.     • hydrochlorothiazide (MICROZIDE) 12.5 MG capsule Take 12.5 mg by mouth.     • losartan (COZAAR) 50 MG Tab Take 50 mg by mouth.     • aspirin 81 MG EC tablet Take 81 mg by mouth every evening.       No current facility-administered medications for this visit.          Allergies   Allergen Reactions   • Norco [Hydrocodone-Acetaminophen] Unspecified     Strange dreams   RXN=8/2016         Family History   Problem Relation Age of Onset   •  "Hypertension Mother    • Hyperlipidemia Mother    • Hypertension Father    • Hyperlipidemia Father          Social History     Socioeconomic History   • Marital status:      Spouse name: Not on file   • Number of children: Not on file   • Years of education: Not on file   • Highest education level: Not on file   Occupational History   • Not on file   Social Needs   • Financial resource strain: Not on file   • Food insecurity     Worry: Not on file     Inability: Not on file   • Transportation needs     Medical: Not on file     Non-medical: Not on file   Tobacco Use   • Smoking status: Never Smoker   • Smokeless tobacco: Never Used   Substance and Sexual Activity   • Alcohol use: No   • Drug use: No   • Sexual activity: Yes     Partners: Female     Birth control/protection: Post-Menopausal   Lifestyle   • Physical activity     Days per week: Not on file     Minutes per session: Not on file   • Stress: Not on file   Relationships   • Social connections     Talks on phone: Not on file     Gets together: Not on file     Attends Holiness service: Not on file     Active member of club or organization: Not on file     Attends meetings of clubs or organizations: Not on file     Relationship status: Not on file   • Intimate partner violence     Fear of current or ex partner: Not on file     Emotionally abused: Not on file     Physically abused: Not on file     Forced sexual activity: Not on file   Other Topics Concern   • Not on file   Social History Narrative   • Not on file         Physical Exam:  Ambulatory Vitals  /82 (BP Location: Left arm, Patient Position: Sitting)   Pulse 82   Resp 14   Ht 1.702 m (5' 7\")   Wt 86.6 kg (191 lb)   SpO2 95%    BP Readings from Last 4 Encounters:   05/29/20 124/82   02/05/20 120/74   09/20/19 160/94   05/11/19 128/76     Weight/BMI:   Vitals:    05/29/20 1620 05/29/20 1655   BP: 148/90 124/82   Weight: 86.6 kg (191 lb)    Height: 1.702 m (5' 7\")     Body mass index is " 29.91 kg/m².  Wt Readings from Last 4 Encounters:   05/29/20 86.6 kg (191 lb)   02/05/20 89.8 kg (198 lb)   09/20/19 88.9 kg (196 lb)   05/11/19 88.1 kg (194 lb 3.2 oz)       Physical Exam   Constitutional: He is oriented to person, place, and time and well-developed, well-nourished, and in no distress. No distress.   HENT:   Head: Normocephalic and atraumatic.   Eyes: Pupils are equal, round, and reactive to light. Conjunctivae are normal.   Neck: Normal range of motion. Neck supple. No JVD present.   Cardiovascular: Normal rate, regular rhythm, normal heart sounds and intact distal pulses. Exam reveals no gallop and no friction rub.   No murmur heard.  Pulmonary/Chest: Effort normal and breath sounds normal. No respiratory distress. He has no wheezes. He has no rales. He exhibits no tenderness.   Abdominal: Soft. Bowel sounds are normal. He exhibits no distension.   Musculoskeletal:         General: No edema.   Neurological: He is alert and oriented to person, place, and time.   Skin: Skin is warm and dry.   Psychiatric: Affect and judgment normal.       Lab Data Review:  Lab Results   Component Value Date/Time    CHOLSTRLTOT 280 (H) 01/16/2020 06:48 AM    CHOLSTRLTOT 208 (H) 03/05/2013 11:37 AM     (H) 01/16/2020 06:48 AM     (H) 03/05/2013 11:37 AM    HDL 56 01/16/2020 06:48 AM    HDL 48 03/05/2013 11:37 AM    TRIGLYCERIDE 120 01/16/2020 06:48 AM    TRIGLYCERIDE 102 03/05/2013 11:37 AM       Lab Results   Component Value Date/Time    SODIUM 138 01/16/2020 06:48 AM    SODIUM 134 (L) 09/07/2016 01:05 PM    POTASSIUM 4.3 01/16/2020 06:48 AM    POTASSIUM 4.0 09/07/2016 01:05 PM    CHLORIDE 101 01/16/2020 06:48 AM    CHLORIDE 99 09/07/2016 01:05 PM    CO2 22 01/16/2020 06:48 AM    CO2 28 09/07/2016 01:05 PM    GLUCOSE 92 01/16/2020 06:48 AM    GLUCOSE 87 09/07/2016 01:05 PM    BUN 22 01/16/2020 06:48 AM    BUN 23 (H) 09/07/2016 01:05 PM    CREATININE 1.23 01/16/2020 06:48 AM    CREATININE 1.60 (H)  09/07/2016 01:05 PM    CREATININE 0.97 01/06/2012 09:23 AM    BUNCREATRAT 18 01/16/2020 06:48 AM    BUNCREATRAT 21 (H) 01/06/2012 09:23 AM     CrCl cannot be calculated (Patient's most recent lab result is older than the maximum 7 days allowed.).  Lab Results   Component Value Date/Time    ALKPHOSPHAT 73 01/16/2020 06:48 AM    ALKPHOSPHAT 64 09/07/2016 01:05 PM    ASTSGOT 18 01/16/2020 06:48 AM    ASTSGOT 10 (L) 09/07/2016 01:05 PM    ALTSGPT 34 01/16/2020 06:48 AM    ALTSGPT 18 09/07/2016 01:05 PM    TBILIRUBIN 0.5 01/16/2020 06:48 AM    TBILIRUBIN 0.6 09/07/2016 01:05 PM      Lab Results   Component Value Date/Time    WBC 11.9 (H) 01/16/2020 06:48 AM    WBC 12.3 (H) 09/07/2016 01:05 PM    WBC 8.5 01/06/2012 09:23 AM     No results found for: HBA1C  No components found for: TROP      Cardiac Imaging and Procedures Review:      EKG 5/29/20 reviewed by me sinus, incomplete RBBB, borderline ST depression II, III, aVF    CT CAC 2/21/20  LM 0, LCx 375, , ; total agatston score 1296    Medical Decision Making:  Problem List Items Addressed This Visit     Hypercholesteremia (Chronic)    Relevant Medications    hydrochlorothiazide (MICROZIDE) 12.5 MG capsule    losartan (COZAAR) 50 MG Tab    rosuvastatin (CRESTOR) 20 MG Tab    Hypertension    Relevant Medications    hydrochlorothiazide (MICROZIDE) 12.5 MG capsule    losartan (COZAAR) 50 MG Tab    rosuvastatin (CRESTOR) 20 MG Tab    Healthcare maintenance    Relevant Orders    REFERRAL TO FOLLOW-UP WITH PRIMARY CARE    Agatston coronary artery calcium score greater than 400    Relevant Medications    hydrochlorothiazide (MICROZIDE) 12.5 MG capsule    losartan (COZAAR) 50 MG Tab    rosuvastatin (CRESTOR) 20 MG Tab    Other Relevant Orders    EKG (Completed)        At this time patient denies symptoms despite exerting regularly.    CAC on CT - resume aspirin 81mg. Resume statin. Recheck lipids next visit. Goal LDL <70.    HTN - controlled. Goal 120/80. Continue  antihypertensives.    EKG changes - will add echo and stress given CAC on CT and HTN to rule out hemodynamically significant obstruction and structural anomaly.     Referral for new PMD. Discuss positive stool occult.     It was my pleasure to meet with Mr. Bateman.

## 2020-05-29 NOTE — TELEPHONE ENCOUNTER
Called patient to see if he has followed with a cardiologist in the past (since seeing Dr. Reyez in 2013) to get records prior to new patient appt with [facility]. Unable to reach patient, left voicemail for call back.

## 2020-06-01 ENCOUNTER — TELEPHONE (OUTPATIENT)
Dept: CARDIOLOGY | Facility: MEDICAL CENTER | Age: 65
End: 2020-06-01

## 2020-06-01 LAB — EKG IMPRESSION: NORMAL

## 2020-06-01 NOTE — TELEPHONE ENCOUNTER
Message   Received: Today   Message Contents   Elfego Rucker M.D.  Linda Castellanos R.N.               Kimberly Mckeon,   Added echo and stress for Mr Fransico. Can we send him information to make sure he gets scheduled? Or forward to scheduling?      Called and s/w pt's wife, [place]. They were already aware of the testing because [facility] called them this morning. Transferred her to image schedulers to arrange appt.

## 2020-06-11 ENCOUNTER — HOSPITAL ENCOUNTER (OUTPATIENT)
Dept: CARDIOLOGY | Facility: MEDICAL CENTER | Age: 65
End: 2020-06-11
Attending: INTERNAL MEDICINE
Payer: COMMERCIAL

## 2020-06-11 DIAGNOSIS — R93.1 AGATSTON CORONARY ARTERY CALCIUM SCORE GREATER THAN 400: ICD-10-CM

## 2020-06-11 DIAGNOSIS — I10 ESSENTIAL HYPERTENSION: ICD-10-CM

## 2020-06-11 PROCEDURE — 93306 TTE W/DOPPLER COMPLETE: CPT

## 2020-06-13 LAB
LV EJECT FRACT  99904: 65
LV EJECT FRACT MOD 2C 99903: 75.61
LV EJECT FRACT MOD 4C 99902: 68.39
LV EJECT FRACT MOD BP 99901: 71.3

## 2020-06-13 PROCEDURE — 93306 TTE W/DOPPLER COMPLETE: CPT | Mod: 26 | Performed by: INTERNAL MEDICINE

## 2020-06-16 ENCOUNTER — HOSPITAL ENCOUNTER (OUTPATIENT)
Dept: RADIOLOGY | Facility: MEDICAL CENTER | Age: 65
End: 2020-06-16
Attending: INTERNAL MEDICINE
Payer: COMMERCIAL

## 2020-06-16 DIAGNOSIS — R93.1 AGATSTON CORONARY ARTERY CALCIUM SCORE GREATER THAN 400: ICD-10-CM

## 2020-06-16 DIAGNOSIS — I10 ESSENTIAL HYPERTENSION: ICD-10-CM

## 2020-06-16 PROCEDURE — A9502 TC99M TETROFOSMIN: HCPCS

## 2020-06-16 PROCEDURE — 93018 CV STRESS TEST I&R ONLY: CPT | Performed by: INTERNAL MEDICINE

## 2020-06-16 PROCEDURE — 78452 HT MUSCLE IMAGE SPECT MULT: CPT | Mod: 26 | Performed by: INTERNAL MEDICINE

## 2020-06-17 ENCOUNTER — PATIENT MESSAGE (OUTPATIENT)
Dept: CARDIOLOGY | Facility: MEDICAL CENTER | Age: 65
End: 2020-06-17

## 2020-06-17 NOTE — PATIENT COMMUNICATION
[employer] Released Result Comments   Viewed by Maximiliano Bateman on 6/17/2020  7:08 AM   Written by Elfego Ruckre M.D. on 6/16/2020  5:15 PM   Hi Mr Bateman,   I attempted to call you after your stress test but could not reach you. Appears your stress did show EKG changes, but the perfusion portion was normal. Please keep a close eye on your blood pressure. We will focus our efforts on prevention. If any new symptoms; especially chest pain or pressure, please reach out.

## 2021-01-28 ENCOUNTER — PATIENT MESSAGE (OUTPATIENT)
Dept: CARDIOLOGY | Facility: MEDICAL CENTER | Age: 66
End: 2021-01-28

## 2021-01-28 DIAGNOSIS — I10 ESSENTIAL HYPERTENSION: ICD-10-CM

## 2021-01-28 DIAGNOSIS — E78.2 MIXED HYPERLIPIDEMIA: ICD-10-CM

## 2021-01-28 DIAGNOSIS — E78.00 HYPERCHOLESTEREMIA: Chronic | ICD-10-CM

## 2021-01-28 DIAGNOSIS — R93.1 AGATSTON CORONARY ARTERY CALCIUM SCORE GREATER THAN 400: ICD-10-CM

## 2021-03-12 DIAGNOSIS — I10 ESSENTIAL HYPERTENSION: ICD-10-CM

## 2021-03-12 RX ORDER — LOSARTAN POTASSIUM AND HYDROCHLOROTHIAZIDE 12.5; 5 MG/1; MG/1
1 TABLET ORAL
Qty: 90 TABLET | Refills: 3 | Status: SHIPPED | OUTPATIENT
Start: 2021-03-12 | End: 2022-04-12

## 2022-04-12 DIAGNOSIS — I10 ESSENTIAL HYPERTENSION: ICD-10-CM

## 2022-04-14 RX ORDER — LOSARTAN POTASSIUM AND HYDROCHLOROTHIAZIDE 12.5; 5 MG/1; MG/1
1 TABLET ORAL DAILY
Qty: 30 TABLET | Refills: 0 | Status: SHIPPED | OUTPATIENT
Start: 2022-04-14 | End: 2022-05-18

## 2022-04-14 NOTE — TELEPHONE ENCOUNTER
Overdue for follow up with Dr. Telles.  30 day courtesy refill provided.     Message to scheduling to contact pt to arrange follow up.

## 2022-04-21 ENCOUNTER — TELEMEDICINE (OUTPATIENT)
Dept: CARDIOLOGY | Facility: MEDICAL CENTER | Age: 67
End: 2022-04-21
Payer: MEDICARE

## 2022-04-21 VITALS
HEART RATE: 78 BPM | BODY MASS INDEX: 28.34 KG/M2 | OXYGEN SATURATION: 92 % | WEIGHT: 187 LBS | HEIGHT: 68 IN | DIASTOLIC BLOOD PRESSURE: 88 MMHG | SYSTOLIC BLOOD PRESSURE: 150 MMHG

## 2022-04-21 DIAGNOSIS — Z00.00 HEALTHCARE MAINTENANCE: ICD-10-CM

## 2022-04-21 DIAGNOSIS — R93.1 AGATSTON CORONARY ARTERY CALCIUM SCORE GREATER THAN 400: ICD-10-CM

## 2022-04-21 DIAGNOSIS — I10 PRIMARY HYPERTENSION: ICD-10-CM

## 2022-04-21 DIAGNOSIS — E78.01 FAMILIAL HYPERCHOLESTEROLEMIA: ICD-10-CM

## 2022-04-21 PROCEDURE — 99214 OFFICE O/P EST MOD 30 MIN: CPT | Mod: 95 | Performed by: INTERNAL MEDICINE

## 2022-04-21 RX ORDER — CLOTRIMAZOLE AND BETAMETHASONE DIPROPIONATE 10; .64 MG/G; MG/G
CREAM TOPICAL
COMMUNITY
Start: 2022-03-22 | End: 2022-04-21

## 2022-04-21 RX ORDER — ASPIRIN 81 MG/1
81 TABLET ORAL EVERY EVENING
Qty: 100 TABLET | Refills: 3 | Status: SHIPPED | OUTPATIENT
Start: 2022-04-21 | End: 2023-04-11

## 2022-04-21 RX ORDER — ROSUVASTATIN CALCIUM 5 MG/1
5 TABLET, COATED ORAL
Qty: 30 TABLET | Refills: 2 | Status: SHIPPED | OUTPATIENT
Start: 2022-04-21

## 2022-04-21 RX ORDER — LEVOTHYROXINE, LIOTHYRONINE 38; 9 UG/1; UG/1
60 TABLET ORAL
COMMUNITY
Start: 2022-03-22 | End: 2023-04-11

## 2022-04-21 ASSESSMENT — ENCOUNTER SYMPTOMS
NEAR-SYNCOPE: 0
DIZZINESS: 0
ORTHOPNEA: 0
BACK PAIN: 0
WEIGHT GAIN: 0
DYSPNEA ON EXERTION: 0
DEPRESSION: 0
BLURRED VISION: 0
FEVER: 0
DECREASED APPETITE: 0
ALTERED MENTAL STATUS: 0
SHORTNESS OF BREATH: 0
VOMITING: 0
IRREGULAR HEARTBEAT: 0
CONSTIPATION: 0
SYNCOPE: 0
WEIGHT LOSS: 0
NAUSEA: 0
PALPITATIONS: 0
COUGH: 0
HEARTBURN: 0
ABDOMINAL PAIN: 0
PND: 0
CLAUDICATION: 0
DIARRHEA: 0
FLANK PAIN: 0

## 2022-04-21 NOTE — PROGRESS NOTES
This evaluation was conducted via Zoom using secure and encrypted videoconferencing technology. The patient was in a private location in the state of [place].     The patient's identity was confirmed and verbal consent was obtained for this virtual visit.     Cardiology Note    Elevated agatston score    History of Present Illness: Maximiliano Bateman is a 65 y.o. male PMH HTN, familial HLD, CAC on CT who presents for follow up visit.    Forgot morning medication today. Typically when taking medications describes blood pressure better controlled. No active cardiac complaints. Able to exert without cardiac limitations. Describes did not start statin due to friend who had severe myalgias. Describes individual actually had muscle wasting and eventually passed. He is afraid to attempt.     Review of Systems   Constitutional: Negative for decreased appetite, fever, malaise/fatigue, weight gain and weight loss.   HENT: Negative for congestion and nosebleeds.    Eyes: Negative for blurred vision.   Cardiovascular: Negative for chest pain, claudication, dyspnea on exertion, irregular heartbeat, leg swelling, near-syncope, orthopnea, palpitations, paroxysmal nocturnal dyspnea and syncope.   Respiratory: Negative for cough and shortness of breath.    Endocrine: Negative for cold intolerance and heat intolerance.   Skin: Negative for rash.   Musculoskeletal: Negative for back pain.   Gastrointestinal: Negative for abdominal pain, constipation, diarrhea, heartburn, melena, nausea and vomiting.   Genitourinary: Negative for dysuria, flank pain and hematuria.   Neurological: Negative for dizziness.   Psychiatric/Behavioral: Negative for altered mental status and depression.         Past Medical History:   Diagnosis Date   • Anesthesia     slow to wake after last surgery   • Dyslipidemia    • GERD (gastroesophageal reflux disease)    • Hiatus hernia syndrome    • High cholesterol    • Hypercholesteremia 3/1/2011   • Hyperlipidemia     • Hypertension    • Nonspecific elevation of levels of transaminase or lactic acid dehydrogenase (LDH) 3/11/2010   • Renal disorder     kidney stones         Past Surgical History:   Procedure Laterality Date   • CYSTOSCOPY  9/28/2016    Procedure: CYSTOSCOPY;  Surgeon: Karson Mcclendon M.D.;  Location: [facility];  Service:    • URETEROSCOPY  9/28/2016    Procedure: URETEROSCOPY;  Surgeon: Karson Mcclendon M.D.;  Location: SURGERY [facility];  Service:    • LASERTRIPSY  9/28/2016    Procedure: LASER LITHOTRIPSY;  Surgeon: Karson Mcclendon M.D.;  Location: SURGERY [facility];  Service:    • CYSTOSCOPY Left 9/7/2016    Procedure: CYSTOSCOPY;  Surgeon: Karson Mcclendon M.D.;  Location: SURGERY [facility];  Service:    • URETEROSCOPY  9/7/2016    Procedure: URETEROSCOPY; stent placement;  Surgeon: Karson Mcclendon M.D.;  Location: SURGERY [facility];  Service:    • KNEE ARTHROSCOPY  2/11/2010    Performed by MIGUEL RICO at SURGERY [facility]   • COLONOSCOPY  2006    normal   • VASECTOMY           Current Outpatient Medications   Medication Sig Dispense Refill   • NP THYROID 60 MG Tab Take 60 mg by mouth every morning on an empty stomach.     • rosuvastatin (CRESTOR) 5 MG Tab Take 1 Tablet by mouth 3 times a week. 30 Tablet 2   • aspirin 81 MG EC tablet Take 1 Tablet by mouth every evening. 100 Tablet 3   • losartan-hydrochlorothiazide (HYZAAR) 50-12.5 MG per tablet Take 1 Tablet by mouth every day. Please call [phone] to schedule follow up appointment for further refills, thank you. 30 Tablet 0   • Ascorbic Acid (VITAMIN C PO) Take  by mouth.     • MAGNESIUM PO Take  by mouth.     • pantoprazole (PROTONIX) 40 MG Tablet Delayed Response TAKE 1 TABLET EVERY DAY 90 Tab 1   • Cholecalciferol (VITAMIN D-3 PO) Take 1 Tab by mouth every day.     • coenzyme Q-10 30 MG capsule Take 300 mg by mouth every day.     • ibuprofen (MOTRIN) 200 MG Tab Take 400-600 mg by mouth every 6 hours  "as needed for Mild Pain.       No current facility-administered medications for this visit.         Allergies   Allergen Reactions   • Norco [Hydrocodone-Acetaminophen] Unspecified     Strange dreams   RXN=8/2016         Family History   Problem Relation Age of Onset   • Hypertension Mother    • Hyperlipidemia Mother    • Hypertension Father    • Hyperlipidemia Father          Social History     Socioeconomic History   • Marital status:      Spouse name: Not on file   • Number of children: Not on file   • Years of education: Not on file   • Highest education level: Not on file   Occupational History   • Not on file   Tobacco Use   • Smoking status: Never Smoker   • Smokeless tobacco: Never Used   Substance and Sexual Activity   • Alcohol use: No   • Drug use: No   • Sexual activity: Yes     Partners: Female     Birth control/protection: Post-Menopausal   Other Topics Concern   • Not on file   Social History Narrative   • Not on file     Social Determinants of Health     Financial Resource Strain: Not on file   Food Insecurity: Not on file   Transportation Needs: Not on file   Physical Activity: Not on file   Stress: Not on file   Social Connections: Not on file   Intimate Partner Violence: Not on file   Housing Stability: Not on file         Physical Exam:  Ambulatory Vitals  /88 (BP Location: Right arm, Patient Position: Sitting, BP Cuff Size: Adult)   Pulse 78   Ht 1.727 m (5' 8\")   Wt 84.8 kg (187 lb)   SpO2 92%    BP Readings from Last 4 Encounters:   04/21/22 150/88   05/29/20 124/82   02/05/20 120/74   09/20/19 160/94     Weight/BMI:   Vitals:    04/21/22 1523   BP: 150/88   Weight: 84.8 kg (187 lb)   Height: 1.727 m (5' 8\")    Body mass index is 28.43 kg/m².  Wt Readings from Last 4 Encounters:   04/21/22 84.8 kg (187 lb)   05/29/20 86.6 kg (191 lb)   02/05/20 89.8 kg (198 lb)   09/20/19 88.9 kg (196 lb)       Physical Exam  Physical Exam:  Constitutional: Alert, no distress, " well-groomed.  Skin: No rashes in visible areas.  Eye: Round. Conjunctiva clear, lids normal. No icterus.   ENMT: Lips pink without lesions, good dentition, moist mucous membranes. Phonation normal.  Neck: No masses, no thyromegaly. Moves freely without pain.  CV: Pulse as reported by patient  Respiratory: Unlabored respiratory effort, no cough or audible wheeze  Psych: Alert and oriented x3, normal affect and mood.     Lab Data Review:  Lab Results   Component Value Date/Time    CHOLSTRLTOT 280 (H) 01/16/2020 06:48 AM    CHOLSTRLTOT 208 (H) 03/05/2013 11:37 AM     (H) 01/16/2020 06:48 AM     (H) 03/05/2013 11:37 AM    HDL 56 01/16/2020 06:48 AM    HDL 48 03/05/2013 11:37 AM    TRIGLYCERIDE 120 01/16/2020 06:48 AM    TRIGLYCERIDE 102 03/05/2013 11:37 AM       Lab Results   Component Value Date/Time    SODIUM 138 01/16/2020 06:48 AM    SODIUM 134 (L) 09/07/2016 01:05 PM    POTASSIUM 4.3 01/16/2020 06:48 AM    POTASSIUM 4.0 09/07/2016 01:05 PM    CHLORIDE 101 01/16/2020 06:48 AM    CHLORIDE 99 09/07/2016 01:05 PM    CO2 22 01/16/2020 06:48 AM    CO2 28 09/07/2016 01:05 PM    GLUCOSE 92 01/16/2020 06:48 AM    GLUCOSE 87 09/07/2016 01:05 PM    BUN 22 01/16/2020 06:48 AM    BUN 23 (H) 09/07/2016 01:05 PM    CREATININE 1.23 01/16/2020 06:48 AM    CREATININE 1.60 (H) 09/07/2016 01:05 PM    CREATININE 0.97 01/06/2012 09:23 AM    BUNCREATRAT 18 01/16/2020 06:48 AM    BUNCREATRAT 21 (H) 01/06/2012 09:23 AM     CrCl cannot be calculated (Patient's most recent lab result is older than the maximum 7 days allowed.).  Lab Results   Component Value Date/Time    ALKPHOSPHAT 73 01/16/2020 06:48 AM    ALKPHOSPHAT 64 09/07/2016 01:05 PM    ASTSGOT 18 01/16/2020 06:48 AM    ASTSGOT 10 (L) 09/07/2016 01:05 PM    ALTSGPT 34 01/16/2020 06:48 AM    ALTSGPT 18 09/07/2016 01:05 PM    TBILIRUBIN 0.5 01/16/2020 06:48 AM    TBILIRUBIN 0.6 09/07/2016 01:05 PM      Lab Results   Component Value Date/Time    WBC 11.9 (H) 01/16/2020  06:48 AM    WBC 12.3 (H) 09/07/2016 01:05 PM    WBC 8.5 01/06/2012 09:23 AM     No results found for: HBA1C  No components found for: TROP      Cardiac Imaging and Procedures Review:      EKG 5/29/20 reviewed by me sinus, incomplete RBBB, borderline ST depression II, III, aVF    CT CAC 2/21/20  LM 0, LCx 375, , ; total agatston score 1296    TTE 6/11/2020  CONCLUSIONS  Normal transthoracic echocardiogram.   Left Ventricle  Normal left ventricular chamber size. Normal left ventricular wall   thickness. Normal left ventricular systolic function. Left ventricular   ejection fraction is visually estimated to be 65%. Normal regional wall   motion. Normal diastolic function.    MPI spect 6/2020   NUCLEAR IMAGING INTERPRETATION   Normal exercise nuclear stress test for ischemia or infarct.   Normal LVSF with EF of 66%.   Positive stress ECG for ischemia.   (3-4 mm of horizontal ST depression in inferolateral leads.)   Arrhythmia: PVC and PAC's.    Chest Pain: None.    Results were discussed with Dr. Rucker.   ECG INTERPRETATION   Positive stress ECG for ischemia.    Medical Decision Making:  Problem List Items Addressed This Visit     Familial hypercholesterolemia    Relevant Medications    rosuvastatin (CRESTOR) 5 MG Tab    Hypertension    Relevant Medications    rosuvastatin (CRESTOR) 5 MG Tab    Healthcare maintenance    Relevant Orders    Referral to establish with [facility] PCP    Agatston coronary artery calcium score greater than 400    Relevant Medications    rosuvastatin (CRESTOR) 5 MG Tab        CAC on CT / familial HLD - resume aspirin 81mg. Resume low dose intermittent statin. Recheck lipids after max tolerated dose achieved. Goal LDL <70.    HTN - elevated however skipped antihypertensive today. Goal 120/80. Continue antihypertensives. Check vitals one week and send results for further titration.    Referral for new PMD.     It was my pleasure to meet with Mr. Bateman.

## 2022-04-21 NOTE — PATIENT INSTRUCTIONS
Send one week of blood pressures.     Rosuvastatin Tablets  What is this medicine?  ROSUVASTATIN (latha YESENIA va sta tin) is known as a HMG-CoA reductase inhibitor or 'statin'. It lowers cholesterol and triglycerides in the blood. This drug may also reduce the risk of heart attack, stroke, or other health problems in patients with risk factors for heart disease. Diet and lifestyle changes are often used with this drug.  This medicine may be used for other purposes; ask your health care provider or pharmacist if you have questions.  COMMON BRAND NAME(S): Crestor  What should I tell my health care provider before I take this medicine?  They need to know if you have any of these conditions:  · diabetes  · if you often drink alcohol  · history of stroke  · kidney disease  · liver disease  · muscle aches or weakness  · thyroid disease  · an unusual or allergic reaction to rosuvastatin, other medicines, foods, dyes, or preservatives  · pregnant or trying to get pregnant  · breast-feeding  How should I use this medicine?  Take this medicine by mouth with a glass of water. Follow the directions on the prescription label. Do not cut, crush or chew this medicine. You can take this medicine with or without food. Take your doses at regular intervals. Do not take your medicine more often than directed.  Talk to your pediatrician regarding the use of this medicine in children. While this drug may be prescribed for children as young as 7 years old for selected conditions, precautions do apply.  Overdosage: If you think you have taken too much of this medicine contact a poison control center or emergency room at once.  NOTE: This medicine is only for you. Do not share this medicine with others.  What if I miss a dose?  If you miss a dose, take it as soon as you can. If your next dose is to be taken in less than 12 hours, then do not take the missed dose. Take the next dose at your regular time. Do not take double or extra doses.  What  may interact with this medicine?  Do not take this medicine with any of the following medications:  · herbal medicines like red yeast rice  This medicine may also interact with the following medications:  · alcohol  · antacids containing aluminum hydroxide or magnesium hydroxide  · cyclosporine  · other medicines for high cholesterol  · some medicines for HIV infection  · warfarin  This list may not describe all possible interactions. Give your health care provider a list of all the medicines, herbs, non-prescription drugs, or dietary supplements you use. Also tell them if you smoke, drink alcohol, or use illegal drugs. Some items may interact with your medicine.  What should I watch for while using this medicine?  Visit your doctor or health care professional for regular check-ups. You may need regular tests to make sure your liver is working properly.  Your health care professional may tell you to stop taking this medicine if you develop muscle problems. If your muscle problems do not go away after stopping this medicine, contact your health care professional.  Do not become pregnant while taking this medicine. Women should inform their health care professional if they wish to become pregnant or think they might be pregnant. There is a potential for serious side effects to an unborn child. Talk to your health care professional or pharmacist for more information. Do not breast-feed an infant while taking this medicine.  This medicine may increase blood sugar. Ask your healthcare provider if changes in diet or medicines are needed if you have diabetes.  If you are going to need surgery or other procedure, tell your doctor that you are using this medicine.  This drug is only part of a total heart-health program. Your doctor or a dietician can suggest a low-cholesterol and low-fat diet to help. Avoid alcohol and smoking, and keep a proper exercise schedule.  This medicine may cause a decrease in Co-Enzyme Q-10. You  should make sure that you get enough Co-Enzyme Q-10 while you are taking this medicine. Discuss the foods you eat and the vitamins you take with your health care professional.  What side effects may I notice from receiving this medicine?  Side effects that you should report to your doctor or health care professional as soon as possible:  · allergic reactions like skin rash, itching or hives, swelling of the face, lips, or tongue  · confusion  · joint pain  · loss of memory  · redness, blistering, peeling or loosening of the skin, including inside the mouth  · signs and symptoms of high blood sugar such as being more thirsty or hungry or having to urinate more than normal. You may also feel very tired or have blurry vision.  · signs and symptoms of muscle injury like dark urine; trouble passing urine or change in the amount of urine; unusually weak or tired; muscle pain or side or back pain  · yellowing of the eyes or skin  Side effects that usually do not require medical attention (report to your doctor or health care professional if they continue or are bothersome):  · constipation  · diarrhea  · dizziness  · gas  · headache  · nausea  · stomach pain  · trouble sleeping  · upset stomach  This list may not describe all possible side effects. Call your doctor for medical advice about side effects. You may report side effects to FDA at 9-916-ZUZ-3112.  Where should I keep my medicine?  Keep out of the reach of children.  Store at room temperature between 20 and 25 degrees C (68 and 77 degrees F). Keep container tightly closed (protect from moisture). Throw away any unused medicine after the expiration date.  NOTE: This sheet is a summary. It may not cover all possible information. If you have questions about this medicine, talk to your doctor, pharmacist, or health care provider.  © 2020 [employer]/Gold Standard (2019-10-10 08:25:08)

## 2022-05-23 DIAGNOSIS — I10 ESSENTIAL HYPERTENSION: ICD-10-CM

## 2022-05-23 RX ORDER — LOSARTAN POTASSIUM AND HYDROCHLOROTHIAZIDE 12.5; 5 MG/1; MG/1
TABLET ORAL
Qty: 90 TABLET | Refills: 3 | Status: SHIPPED | OUTPATIENT
Start: 2022-05-23 | End: 2023-11-29

## 2022-05-23 RX ORDER — LOSARTAN POTASSIUM AND HYDROCHLOROTHIAZIDE 12.5; 5 MG/1; MG/1
TABLET ORAL
Qty: 90 TABLET | Refills: 3 | Status: SHIPPED | OUTPATIENT
Start: 2022-05-23 | End: 2022-05-23 | Stop reason: SDUPTHER

## 2022-08-09 ENCOUNTER — OFFICE VISIT (OUTPATIENT)
Dept: URGENT CARE | Facility: PHYSICIAN GROUP | Age: 67
End: 2022-08-09
Payer: MEDICARE

## 2022-08-09 ENCOUNTER — HOSPITAL ENCOUNTER (OUTPATIENT)
Dept: RADIOLOGY | Facility: MEDICAL CENTER | Age: 67
End: 2022-08-09
Attending: PHYSICIAN ASSISTANT
Payer: MEDICARE

## 2022-08-09 ENCOUNTER — APPOINTMENT (OUTPATIENT)
Dept: RADIOLOGY | Facility: IMAGING CENTER | Age: 67
End: 2022-08-09
Attending: PHYSICIAN ASSISTANT
Payer: MEDICARE

## 2022-08-09 VITALS
HEART RATE: 74 BPM | WEIGHT: 196.2 LBS | TEMPERATURE: 98.8 F | OXYGEN SATURATION: 95 % | BODY MASS INDEX: 29.73 KG/M2 | HEIGHT: 68 IN | RESPIRATION RATE: 16 BRPM | DIASTOLIC BLOOD PRESSURE: 68 MMHG | SYSTOLIC BLOOD PRESSURE: 130 MMHG

## 2022-08-09 DIAGNOSIS — Z87.442 HISTORY OF NEPHROLITHIASIS: ICD-10-CM

## 2022-08-09 DIAGNOSIS — R10.9 RIGHT FLANK PAIN: ICD-10-CM

## 2022-08-09 DIAGNOSIS — N20.1 RIGHT URETERAL STONE: ICD-10-CM

## 2022-08-09 LAB
APPEARANCE UR: CLEAR
BILIRUB UR STRIP-MCNC: NEGATIVE MG/DL
COLOR UR AUTO: YELLOW
GLUCOSE UR STRIP.AUTO-MCNC: NEGATIVE MG/DL
KETONES UR STRIP.AUTO-MCNC: NEGATIVE MG/DL
LEUKOCYTE ESTERASE UR QL STRIP.AUTO: NEGATIVE
NITRITE UR QL STRIP.AUTO: NEGATIVE
PH UR STRIP.AUTO: 5 [PH] (ref 5–8)
PROT UR QL STRIP: NEGATIVE MG/DL
RBC UR QL AUTO: NORMAL
SP GR UR STRIP.AUTO: 1
UROBILINOGEN UR STRIP-MCNC: 0.2 MG/DL

## 2022-08-09 PROCEDURE — 74018 RADEX ABDOMEN 1 VIEW: CPT | Mod: TC | Performed by: PHYSICIAN ASSISTANT

## 2022-08-09 PROCEDURE — 74176 CT ABD & PELVIS W/O CONTRAST: CPT | Mod: ME

## 2022-08-09 PROCEDURE — 99214 OFFICE O/P EST MOD 30 MIN: CPT | Performed by: PHYSICIAN ASSISTANT

## 2022-08-09 PROCEDURE — 81002 URINALYSIS NONAUTO W/O SCOPE: CPT | Performed by: PHYSICIAN ASSISTANT

## 2022-08-09 RX ORDER — TAMSULOSIN HYDROCHLORIDE 0.4 MG/1
0.4 CAPSULE ORAL
Qty: 30 CAPSULE | Refills: 0 | Status: SHIPPED | OUTPATIENT
Start: 2022-08-09 | End: 2023-04-11

## 2022-08-09 NOTE — PROGRESS NOTES
Subjective     Maximiliano Bateman is a 67 y.o. male who presents with Other (Kidney stone, right side pain, fatigue, x2 days )    HPI:  Maximiliano Bateman is a 67 y.o. male who presents today for evaluation of a possible kidney stone.  Patient reports that for the past 2 days he has been having some right flank pain, right abdominal pain, and extreme fatigue.  He says that he has had the symptoms before in conjunction with kidney stones.  He reports that the pain has been bad at times to create nausea but he has not had any vomiting associated with it.  He also denies any change in bowel habits, fever/chills, or blood in the urine.  He is use OTC analgesics which  provides mild relief.        Review of Systems   Constitutional:  Positive for malaise/fatigue. Negative for chills and fever.   Respiratory:  Negative for shortness of breath.    Cardiovascular:  Negative for chest pain.   Gastrointestinal:  Positive for abdominal pain and nausea. Negative for blood in stool, constipation, diarrhea, melena and vomiting.   Genitourinary:  Positive for flank pain. Negative for dysuria and hematuria.       PMH:  has a past medical history of Anesthesia, Dyslipidemia, GERD (gastroesophageal reflux disease), Hiatus hernia syndrome, High cholesterol, Hypercholesteremia (3/1/2011), Hyperlipidemia, Hypertension, Nonspecific elevation of levels of transaminase or lactic acid dehydrogenase (LDH) (3/11/2010), and Renal disorder.  MEDS:   Current Outpatient Medications:     losartan-hydrochlorothiazide (HYZAAR) 50-12.5 MG per tablet, TAKE 1 TABLET BY MOUTH ONCE DAILY ., Disp: 90 Tablet, Rfl: 3    NP THYROID 60 MG Tab, Take 60 mg by mouth every morning on an empty stomach., Disp: , Rfl:     aspirin 81 MG EC tablet, Take 1 Tablet by mouth every evening., Disp: 100 Tablet, Rfl: 3    Ascorbic Acid (VITAMIN C PO), Take  by mouth., Disp: , Rfl:     MAGNESIUM PO, Take  by mouth., Disp: , Rfl:     pantoprazole (PROTONIX) 40 MG Tablet  "Delayed Response, TAKE 1 TABLET EVERY DAY, Disp: 90 Tab, Rfl: 1    Cholecalciferol (VITAMIN D-3 PO), Take 1 Tab by mouth every day., Disp: , Rfl:     coenzyme Q-10 30 MG capsule, Take 300 mg by mouth every day., Disp: , Rfl:     ibuprofen (MOTRIN) 200 MG Tab, Take 400-600 mg by mouth every 6 hours as needed for Mild Pain., Disp: , Rfl:     rosuvastatin (CRESTOR) 5 MG Tab, Take 1 Tablet by mouth 3 times a week. (Patient not taking: Reported on 8/9/2022), Disp: 30 Tablet, Rfl: 2  ALLERGIES:   Allergies   Allergen Reactions    Norco [Hydrocodone-Acetaminophen] Unspecified     Strange dreams   RXN=8/2016     SURGHX:   Past Surgical History:   Procedure Laterality Date    CYSTOSCOPY  9/28/2016    Procedure: CYSTOSCOPY;  Surgeon: Karson Mcclendon M.D.;  Location: [facility];  Service:     URETEROSCOPY  9/28/2016    Procedure: URETEROSCOPY;  Surgeon: Karson Mcclendon M.D.;  Location: [facility];  Service:     LASERTRIPSY  9/28/2016    Procedure: LASER LITHOTRIPSY;  Surgeon: Karson Mcclendon M.D.;  Location: [facility];  Service:     CYSTOSCOPY Left 9/7/2016    Procedure: CYSTOSCOPY;  Surgeon: Karson Mcclendon M.D.;  Location: [facility];  Service:     URETEROSCOPY  9/7/2016    Procedure: URETEROSCOPY; stent placement;  Surgeon: Karson Mcclendon M.D.;  Location: [facility];  Service:     KNEE ARTHROSCOPY  2/11/2010    Performed by [facility]IGUEL [facility]ICO at [facility]    COLONOSCOPY  2006    normal    VASECTOMY       SOCHX:  reports that he has never smoked. He has never used smokeless tobacco. He reports that he does not drink alcohol and does not use drugs.  FH: Family history was reviewed, no pertinent findings to report      Objective     /68 (BP Location: Right arm, Patient Position: Sitting, BP Cuff Size: Adult)   Pulse 74   Temp 37.1 °C (98.8 °F) (Temporal)   Resp 16   Ht 1.727 m (5' 8\")   Wt 89 kg (196 lb 3.2 oz)   SpO2 95%   BMI 29.83 " kg/m²      Physical Exam  Constitutional:       Appearance: Normal appearance. He is well-developed.   HENT:      Head: Normocephalic and atraumatic.      Right Ear: External ear normal.      Left Ear: External ear normal.   Eyes:      Conjunctiva/sclera: Conjunctivae normal.      Pupils: Pupils are equal, round, and reactive to light.   Cardiovascular:      Rate and Rhythm: Normal rate and regular rhythm.      Heart sounds: No murmur heard.  Pulmonary:      Effort: Pulmonary effort is normal.      Breath sounds: Normal breath sounds.   Abdominal:      General: Abdomen is flat. Bowel sounds are normal.      Palpations: Abdomen is soft.      Tenderness: There is abdominal tenderness in the right upper quadrant, right lower quadrant and suprapubic area. There is right CVA tenderness. There is no guarding or rebound. Negative signs include Deshpande's sign.   Skin:     General: Skin is warm and dry.      Capillary Refill: Capillary refill takes less than 2 seconds.   Neurological:      Mental Status: He is alert and oriented to person, place, and time.   Psychiatric:         Behavior: Behavior normal.         Judgment: Judgment normal.       POCT Urinalysis  Lab Results   Component Value Date/Time    POCCOLOR yellow 08/09/2022 11:36 AM    POCAPPEAR clear 08/09/2022 11:36 AM    POCLEUKEST negative 08/09/2022 11:36 AM    POCNITRITE negative 08/09/2022 11:36 AM    POCUROBILIGE 0.2 08/09/2022 11:36 AM    POCPROTEIN negative 08/09/2022 11:36 AM    POCURPH 5.0 08/09/2022 11:36 AM    POCBLOOD trace-lysed 08/09/2022 11:36 AM    POCSPGRV 1.005 08/09/2022 11:36 AM    POCKETONES negative 08/09/2022 11:36 AM    POCBILIRUBIN negative 08/09/2022 11:36 AM    POCGLUCUA negative 08/09/2022 11:36 AM        [id number] VIEW  IMPRESSION:     Calcific density projecting over the RIGHT kidney and RIGHT pelvis could be in the urinary tract or could be related to atherosclerosis or inspissated material in bowel. Further assessment could be  performed with CT if clinically appropriate.        CT-RENAL COLIC EVALUATION(A/P W/O)  IMPRESSION:  1.  OBSTRUCTING 3 MM CALCULUS IN THE RIGHT MID URETER CAUSES RIGHT HYDRONEPHROSIS AND PROXIMAL HYDROURETER.     2.  A SINGLE CALCIFICATION IDENTIFIED POSTERIORLY IN THE RIGHT KIDNEY.     3.  NO LEFT HYDRONEPHROSIS.     4.  THERE IS A LEFT INGUINAL HERNIA CONTAINS A LOOP OF SIGMOID COLON WITH NO EVIDENCE OF OBSTRUCTION.      Assessment & Plan     1. Right ureteral stone  - [id number] VIEW; Future  - POCT Urinalysis  - CT-RENAL COLIC EVALUATION(A/P W/O); Future  - tamsulosin (FLOMAX) 0.4 MG capsule; Take 1 Capsule by mouth 1/2 hour after breakfast.  Dispense: 30 Capsule; Refill: 0    2. History of nephrolithiasis  - [id number] VIEW; Future  - POCT Urinalysis  - CT-RENAL COLIC EVALUATION(A/P W/O); Future      X-ray obtained first which unfortunately did not give an accurate depiction of any potential stones.  Patient having pain bad enough to cause nausea so CT was then ordered to further evaluate which did show an obstructing 3 mm calculus in the right mid ureter.  It is causing mild right hydronephrosis.  Discussed that at 3 mm the stone should pass unaided.  Tamsulosin prescribed.  Patient advised to drink plenty of fluids.  OTC analgesics as needed for pain.  Strict ER precautions discussed for severely worsening symptoms.  If no improvement in symptoms after 4 to 5 days we can place referral to urology if needed.                      Differential Diagnosis, natural history, and supportive care discussed. Return to the Urgent Care or follow up with your PCP if symptoms fail to resolve, or for any new or worsening symptoms. Emergency room precautions discussed. Patient and/or family appears understanding of information

## 2022-08-14 ASSESSMENT — ENCOUNTER SYMPTOMS
DIARRHEA: 0
ABDOMINAL PAIN: 1
VOMITING: 0
CONSTIPATION: 0
NAUSEA: 1
CHILLS: 0
BLOOD IN STOOL: 0
SHORTNESS OF BREATH: 0
FLANK PAIN: 1
FEVER: 0

## 2023-04-11 ENCOUNTER — APPOINTMENT (OUTPATIENT)
Dept: RADIOLOGY | Facility: MEDICAL CENTER | Age: 68
End: 2023-04-11
Attending: EMERGENCY MEDICINE
Payer: MEDICARE

## 2023-04-11 ENCOUNTER — HOSPITAL ENCOUNTER (EMERGENCY)
Facility: MEDICAL CENTER | Age: 68
End: 2023-04-11
Attending: EMERGENCY MEDICINE
Payer: MEDICARE

## 2023-04-11 VITALS
TEMPERATURE: 97.7 F | RESPIRATION RATE: 18 BRPM | WEIGHT: 190.7 LBS | SYSTOLIC BLOOD PRESSURE: 105 MMHG | HEART RATE: 95 BPM | OXYGEN SATURATION: 95 % | HEIGHT: 68 IN | BODY MASS INDEX: 28.9 KG/M2 | DIASTOLIC BLOOD PRESSURE: 69 MMHG

## 2023-04-11 DIAGNOSIS — R06.00 DYSPNEA, UNSPECIFIED TYPE: ICD-10-CM

## 2023-04-11 LAB
ALBUMIN SERPL BCP-MCNC: 4 G/DL (ref 3.2–4.9)
ALBUMIN/GLOB SERPL: 1.1 G/DL
ALP SERPL-CCNC: 117 U/L (ref 30–99)
ALT SERPL-CCNC: 46 U/L (ref 2–50)
ANION GAP SERPL CALC-SCNC: 10 MMOL/L (ref 7–16)
AST SERPL-CCNC: 19 U/L (ref 12–45)
BASOPHILS # BLD AUTO: 0.6 % (ref 0–1.8)
BASOPHILS # BLD: 0.06 K/UL (ref 0–0.12)
BILIRUB SERPL-MCNC: 0.5 MG/DL (ref 0.1–1.5)
BUN SERPL-MCNC: 24 MG/DL (ref 8–22)
CALCIUM ALBUM COR SERPL-MCNC: 9.8 MG/DL (ref 8.5–10.5)
CALCIUM SERPL-MCNC: 9.8 MG/DL (ref 8.4–10.2)
CHLORIDE SERPL-SCNC: 101 MMOL/L (ref 96–112)
CO2 SERPL-SCNC: 28 MMOL/L (ref 20–33)
CREAT SERPL-MCNC: 1.24 MG/DL (ref 0.5–1.4)
EKG IMPRESSION: NORMAL
EOSINOPHIL # BLD AUTO: 0.52 K/UL (ref 0–0.51)
EOSINOPHIL NFR BLD: 4.8 % (ref 0–6.9)
ERYTHROCYTE [DISTWIDTH] IN BLOOD BY AUTOMATED COUNT: 39.9 FL (ref 35.9–50)
GFR SERPLBLD CREATININE-BSD FMLA CKD-EPI: 64 ML/MIN/1.73 M 2
GLOBULIN SER CALC-MCNC: 3.7 G/DL (ref 1.9–3.5)
GLUCOSE SERPL-MCNC: 98 MG/DL (ref 65–99)
HCT VFR BLD AUTO: 37.6 % (ref 42–52)
HGB BLD-MCNC: 12.2 G/DL (ref 14–18)
IMM GRANULOCYTES # BLD AUTO: 0.03 K/UL (ref 0–0.11)
IMM GRANULOCYTES NFR BLD AUTO: 0.3 % (ref 0–0.9)
LYMPHOCYTES # BLD AUTO: 1.49 K/UL (ref 1–4.8)
LYMPHOCYTES NFR BLD: 13.7 % (ref 22–41)
MCH RBC QN AUTO: 29 PG (ref 27–33)
MCHC RBC AUTO-ENTMCNC: 32.4 G/DL (ref 33.7–35.3)
MCV RBC AUTO: 89.3 FL (ref 81.4–97.8)
MONOCYTES # BLD AUTO: 0.91 K/UL (ref 0–0.85)
MONOCYTES NFR BLD AUTO: 8.4 % (ref 0–13.4)
NEUTROPHILS # BLD AUTO: 7.84 K/UL (ref 1.82–7.42)
NEUTROPHILS NFR BLD: 72.2 % (ref 44–72)
NRBC # BLD AUTO: 0 K/UL
NRBC BLD-RTO: 0 /100 WBC
NT-PROBNP SERPL IA-MCNC: 3041 PG/ML (ref 0–125)
PLATELET # BLD AUTO: 548 K/UL (ref 164–446)
PMV BLD AUTO: 8.5 FL (ref 9–12.9)
POTASSIUM SERPL-SCNC: 4.8 MMOL/L (ref 3.6–5.5)
PROT SERPL-MCNC: 7.7 G/DL (ref 6–8.2)
RBC # BLD AUTO: 4.21 M/UL (ref 4.7–6.1)
SODIUM SERPL-SCNC: 139 MMOL/L (ref 135–145)
TROPONIN T SERPL-MCNC: 69 NG/L (ref 6–19)
TROPONIN T SERPL-MCNC: 91 NG/L (ref 6–19)
WBC # BLD AUTO: 10.9 K/UL (ref 4.8–10.8)

## 2023-04-11 PROCEDURE — 84484 ASSAY OF TROPONIN QUANT: CPT | Mod: 91

## 2023-04-11 PROCEDURE — 71045 X-RAY EXAM CHEST 1 VIEW: CPT

## 2023-04-11 PROCEDURE — 85025 COMPLETE CBC W/AUTO DIFF WBC: CPT

## 2023-04-11 PROCEDURE — 700111 HCHG RX REV CODE 636 W/ 250 OVERRIDE (IP): Performed by: EMERGENCY MEDICINE

## 2023-04-11 PROCEDURE — 83880 ASSAY OF NATRIURETIC PEPTIDE: CPT

## 2023-04-11 PROCEDURE — 99284 EMERGENCY DEPT VISIT MOD MDM: CPT

## 2023-04-11 PROCEDURE — 36415 COLL VENOUS BLD VENIPUNCTURE: CPT

## 2023-04-11 PROCEDURE — 93005 ELECTROCARDIOGRAM TRACING: CPT | Performed by: EMERGENCY MEDICINE

## 2023-04-11 PROCEDURE — 96374 THER/PROPH/DIAG INJ IV PUSH: CPT

## 2023-04-11 PROCEDURE — 80053 COMPREHEN METABOLIC PANEL: CPT

## 2023-04-11 RX ORDER — THIAMINE HCL 100 MG
1000 TABLET ORAL EVERY EVENING
COMMUNITY

## 2023-04-11 RX ORDER — FUROSEMIDE 10 MG/ML
20 INJECTION INTRAMUSCULAR; INTRAVENOUS ONCE
Status: COMPLETED | OUTPATIENT
Start: 2023-04-11 | End: 2023-04-11

## 2023-04-11 RX ORDER — OXYCODONE HYDROCHLORIDE 5 MG/1
5 TABLET ORAL EVERY 6 HOURS PRN
COMMUNITY

## 2023-04-11 RX ORDER — CLOPIDOGREL BISULFATE 75 MG/1
75 TABLET ORAL DAILY
COMMUNITY

## 2023-04-11 RX ORDER — ASPIRIN 81 MG/1
81 TABLET, CHEWABLE ORAL EVERY EVENING
COMMUNITY

## 2023-04-11 RX ORDER — ATORVASTATIN CALCIUM 40 MG/1
40 TABLET, FILM COATED ORAL NIGHTLY
COMMUNITY

## 2023-04-11 RX ORDER — FUROSEMIDE 20 MG/1
20 TABLET ORAL 2 TIMES DAILY
COMMUNITY

## 2023-04-11 RX ORDER — POTASSIUM CHLORIDE 750 MG/1
10 TABLET, EXTENDED RELEASE ORAL 2 TIMES DAILY
COMMUNITY

## 2023-04-11 RX ADMIN — FUROSEMIDE 20 MG: 10 INJECTION, SOLUTION INTRAMUSCULAR; INTRAVENOUS at 13:30

## 2023-04-11 ASSESSMENT — LIFESTYLE VARIABLES
DO YOU DRINK ALCOHOL: YES
HAVE YOU EVER FELT YOU SHOULD CUT DOWN ON YOUR DRINKING: NO

## 2023-04-11 NOTE — DISCHARGE INSTRUCTIONS
Continue your current medication and follow-up with the cardiothoracic surgeon tomorrow as scheduled.  Return to the emergency department if you are acutely worse.

## 2023-04-11 NOTE — ED TRIAGE NOTES
"Chief Complaint   Patient presents with    Shortness of Breath     Pt states has felt short of breath s/p Open Heart Surgery (CABG x 3) on 03/28/23 at [facility]     /88   Pulse 92   Temp 36.6 °C (97.8 °F) (Temporal)   Resp 15   Ht 1.727 m (5' 8\")   Wt 86.5 kg (190 lb 11.2 oz)   SpO2 96%   BMI 29.00 kg/m²     Pt ambulated to ED w/ visitor for c/o feeling short of breath.  Pt denies c/o CP or abd pain.   "

## 2023-04-11 NOTE — ED NOTES
Med rec updated and complete, per pt   Allergies reviewed, per pt  Interviewed pt with wife at bedside with permission from pt.  Pts wife had a list of medications that she read to this writer. Per pts wife reports that pt was told to stop taking his LOSARTAN-HCTZ 50-12.5MG and ROSUVASTATIN 5MG on 3/27/2023.

## 2023-04-11 NOTE — ED PROVIDER NOTES
ED Provider Note    CHIEF COMPLAINT  Chief Complaint   Patient presents with    Shortness of Breath     Pt states has felt short of breath s/p Open Heart Surgery (CABG x 3) on 03/28/23 at [facility]       EXTERNAL RECORDS REVIEWED  Other discharge summary from [facility] on 1 April    ARNOLD/BINA    Maximiliano Bateman is a 68 y.o. male who presents with shortness of breath.  The patient had open heart surgery on 28 March at [facility] by Dr. Vaca.  The patient was subsequently discharged.  He states has been short of breath since the procedure.  The patient states that he been having shortness of breath that is exertional as well as lethargy and some periodic chest pressure prior to the open heart surgery.  He had a CT angiogram that showed evidence of the coronary artery occlusion and therefore had the bypass surgery.  The patient states the surgery was uneventful except for the continued shortness of breath.  He was discharged and contacted his primary care provider as he continues to have shortness of breath despite the surgery.  He states he is having hard time increasing his levels on his incentive spirometer.  He does not have any chest pain.  He has not had any fevers.  He also denies swelling to his lower extremities.  Does have some generalized malaise and lethargy.    PAST MEDICAL HISTORY   has a past medical history of Anesthesia, Dyslipidemia, GERD (gastroesophageal reflux disease), Hiatus hernia syndrome, High cholesterol, Hypercholesteremia (3/1/2011), Hyperlipidemia, Hypertension, Nonspecific elevation of levels of transaminase or lactic acid dehydrogenase (LDH) (3/11/2010), and Renal disorder.    SURGICAL HISTORY   has a past surgical history that includes knee arthroscopy (2/11/2010); colonoscopy (2006); vasectomy; cystoscopy (Left, 9/7/2016); ureteroscopy (9/7/2016); cystoscopy (9/28/2016); ureteroscopy (9/28/2016); and lasertripsy  "(9/28/2016).    FAMILY HISTORY  Family History   Problem Relation Age of Onset    Hypertension Mother     Hyperlipidemia Mother     Hypertension Father     Hyperlipidemia Father        SOCIAL HISTORY  Social History     Tobacco Use    Smoking status: Never    Smokeless tobacco: Never   Substance and Sexual Activity    Alcohol use: No    Drug use: No    Sexual activity: Yes     Partners: Female     Birth control/protection: Post-Menopausal       CURRENT MEDICATIONS  Home Medications    **Home medications have not yet been reviewed for this encounter**         ALLERGIES  Allergies   Allergen Reactions    Norco [Hydrocodone-Acetaminophen] Unspecified     Strange dreams   RXN=8/2016       PHYSICAL EXAM  VITAL SIGNS: /88   Pulse 92   Temp 36.6 °C (97.8 °F) (Temporal)   Resp 15   Ht 1.727 m (5' 8\")   Wt 86.5 kg (190 lb 11.2 oz)   SpO2 96%   BMI 29.00 kg/m²    In general the patient does not appear toxic    HEENT unremarkable    Pulmonary the patient has significantly diminished breath sounds at the left lung base, no wheezing, no rhonchi, no rales of note he does feel like he has a hoarse voice    Cardiovascular S1-S2 with a regular rate and rhythm    GI abdomen soft    Skin no pallor nor cyanosis    Extremities no edema    Neurologic examination is grossly intact    DIAGNOSTIC STUDIES   Results for orders placed or performed during the hospital encounter of 04/11/23   CBC w/ Differential   Result Value Ref Range    WBC 10.9 (H) 4.8 - 10.8 K/uL    RBC 4.21 (L) 4.70 - 6.10 M/uL    Hemoglobin 12.2 (L) 14.0 - 18.0 g/dL    Hematocrit 37.6 (L) 42.0 - 52.0 %    MCV 89.3 81.4 - 97.8 fL    MCH 29.0 27.0 - 33.0 pg    MCHC 32.4 (L) 33.7 - 35.3 g/dL    RDW 39.9 35.9 - 50.0 fL    Platelet Count 548 (H) 164 - 446 K/uL    MPV 8.5 (L) 9.0 - 12.9 fL    Neutrophils-Polys 72.20 (H) 44.00 - 72.00 %    Lymphocytes 13.70 (L) 22.00 - 41.00 %    Monocytes 8.40 0.00 - 13.40 %    Eosinophils 4.80 0.00 - 6.90 %    Basophils 0.60 0.00 " - 1.80 %    Immature Granulocytes 0.30 0.00 - 0.90 %    Nucleated RBC 0.00 /100 WBC    Neutrophils (Absolute) 7.84 (H) 1.82 - 7.42 K/uL    Lymphs (Absolute) 1.49 1.00 - 4.80 K/uL    Monos (Absolute) 0.91 (H) 0.00 - 0.85 K/uL    Eos (Absolute) 0.52 (H) 0.00 - 0.51 K/uL    Baso (Absolute) 0.06 0.00 - 0.12 K/uL    Immature Granulocytes (abs) 0.03 0.00 - 0.11 K/uL    NRBC (Absolute) 0.00 K/uL   Complete Metabolic Panel (CMP)   Result Value Ref Range    Sodium 139 135 - 145 mmol/L    Potassium 4.8 3.6 - 5.5 mmol/L    Chloride 101 96 - 112 mmol/L    Co2 28 20 - 33 mmol/L    Anion Gap 10.0 7.0 - 16.0    Glucose 98 65 - 99 mg/dL    Bun 24 (H) 8 - 22 mg/dL    Creatinine 1.24 0.50 - 1.40 mg/dL    Calcium 9.8 8.4 - 10.2 mg/dL    AST(SGOT) 19 12 - 45 U/L    ALT(SGPT) 46 2 - 50 U/L    Alkaline Phosphatase 117 (H) 30 - 99 U/L    Total Bilirubin 0.5 0.1 - 1.5 mg/dL    Albumin 4.0 3.2 - 4.9 g/dL    Total Protein 7.7 6.0 - 8.2 g/dL    Globulin 3.7 (H) 1.9 - 3.5 g/dL    A-G Ratio 1.1 g/dL   proBrain Natriuretic Peptide, NT   Result Value Ref Range    NT-proBNP 3041 (H) 0 - 125 pg/mL   Troponin - STAT Once   Result Value Ref Range    Troponin T 91 (H) 6 - 19 ng/L   CORRECTED CALCIUM   Result Value Ref Range    Correct Calcium 9.8 8.5 - 10.5 mg/dL   ESTIMATED GFR   Result Value Ref Range    GFR (CKD-EPI) 64 >60 mL/min/1.73 m 2   TROPONIN   Result Value Ref Range    Troponin T 69 (H) 6 - 19 ng/L   EKG   Result Value Ref Range    Report       [facility] Emergency Dept.    Test Date:  2023  Pt Name:    MARS WEINER                Department: [facility]  MRN:        [id number]                      [facility]  Gender:     Male                         Technician: [id number]  :        [id number]                   Requested By:EKKE MOBLEY  Order #:    [id number]                    Reading MD: KEKE MOBLEY MD    Measurements  Intervals                                Axis  Rate:       85                            P:          67  KS:         160                          QRS:        -161  QRSD:       139                          T:          97  QT:         408  QTc:        486    Interpretive Statements  Twelve-lead EKG shows a normal sinus rhythm with a ventricular to 85, QRS  shows  a right bundle branch block, T wave inversion anteriorly and laterally, no ST  segment elevation or depression  Electronically Signed On 4- 12:55:29 PDT by KEKE MOBLEY MD       EKG  I have independently interpreted this EKG  Please see my interpretation above      RADIOLOGY  I have independently interpreted the diagnostic imaging associated with this visit and am waiting the final reading from the radiologist.   My preliminary interpretation is as follows: Chest x-ray shows a left-sided effusion  Radiologist interpretation:   DX-CHEST-PORTABLE (1 VIEW)   Final Result      Moderate left subpulmonic effusion, basilar atelectasis with consolidation not excluded      Moderate cardiac silhouette enlargement following sternotomy. No evidence of pulmonary edema            COURSE & MEDICAL DECISION MAKING    ED Observation Status? Yes; I am placing the patient in to an observation status due to a diagnostic uncertainty as well as therapeutic intensity. Patient placed in observation status at 1036 AM, 4/11/2023.     Observation plan is as follows: Patient presents with postoperative dyspnea after open heart surgery.  The patient will require extensive work-up including imaging or laboratory analysis.  Therefore the patient will be admitted under ED observation status as the work-up is obtained.    1255PM the patient is reexamined and continues to appear stable and nonhypoxic.  Does continue have the diminished breath sounds on the left.  Work-up has showed an effusion versus infiltrate on the left.  My suspicion is this is more from a postoperative etiology.  His BNP is significantly elevated.  He also has a slight elevation in the  troponin but he has not had any chest pain his EKG does not show any ischemic change.  I spoke with Dr. Vaca the patient's cardiovascular surgeon he states the BNP as well as a chest x-ray is to be expected at this time postoperatively.  He would like to have the patient receive dose of Lasix and he will follow the patient tomorrow in the clinic.  Secondary to the elevation of the troponin I did repeat a troponin to make sure the patient does not have any evidence of ongoing ischemic disease causing his presenting findings.    1430 the patient was reexamined and continues to be stable.  His repeat troponin is decreased from his first blood draw and again is unlikely to have an acute ischemic change.  The patient will follow-up with the cardiothoracic surgeon tomorrow as discussed above.    Upon Reevaluation, the patient's condition has: Improved; and will be discharged.    Patient discharged from ED Observation status at 1430 (Time) 4/11/23 (Date).         FINAL DIAGNOSIS  1.  Dyspnea suspect secondary to postoperative left pleural effusion    Disposition  The patient will be discharged in stable condition       Electronically signed by: Mitesh Clark M.D., 4/11/2023 10:36 AM

## 2023-05-15 ENCOUNTER — NON-PROVIDER VISIT (OUTPATIENT)
Dept: CARDIOLOGY | Facility: MEDICAL CENTER | Age: 68
End: 2023-05-15
Payer: MEDICARE

## 2023-05-15 DIAGNOSIS — Z95.1 STATUS POST CORONARY ARTERY BYPASS GRAFT: Primary | ICD-10-CM

## 2023-05-15 PROCEDURE — G0422 INTENS CARDIAC REHAB W/EXERC: HCPCS | Performed by: INTERNAL MEDICINE

## 2023-05-15 PROCEDURE — G0423 INTENS CARDIAC REHAB NO EXER: HCPCS | Mod: 59 | Performed by: INTERNAL MEDICINE

## 2023-05-15 NOTE — PROGRESS NOTES
Patient arrived for initial 1:1 Intensive Cardiac Rehabilitation Consultation and Education session with the Registered Nurse.  A total of 60 minutes was spent with the patient during which time a focused cardiovascular assessment was completed and musculoskeletal limitations were addressed in preparation to safely start the exercise portion of the program.  Education regarding the program was explained including exercise goals, precautions, and target heart rate during exercise.  Nutrition goals were reviewed and patient was introduced to the [facility].  Stress management goals were dicussed and patient concerns and questions were answered at this time. Patient arrived for education at 1030 and visit was concluded at 1230.  Exercise time was from 1130 to 1230.

## 2023-05-16 ENCOUNTER — NON-PROVIDER VISIT (OUTPATIENT)
Dept: CARDIOLOGY | Facility: MEDICAL CENTER | Age: 68
End: 2023-05-16
Payer: MEDICARE

## 2023-05-16 DIAGNOSIS — Z95.1 STATUS POST CORONARY ARTERY BYPASS GRAFT: ICD-10-CM

## 2023-05-16 PROCEDURE — G0423 INTENS CARDIAC REHAB NO EXER: HCPCS | Mod: 59 | Performed by: INTERNAL MEDICINE

## 2023-05-16 PROCEDURE — G0422 INTENS CARDIAC REHAB W/EXERC: HCPCS | Performed by: INTERNAL MEDICINE

## 2023-05-16 NOTE — PROGRESS NOTES
Maximiliano Tenorio Khurramemily attended Intensive Cardiac Rehab today from 1600 to 1800. During his time he exercised and attended class.   His education today was a video titled: How Our Thoughts Heal Our Hearts. Patient received handouts and class discussion pertaining to the topic.

## 2023-05-17 ENCOUNTER — NON-PROVIDER VISIT (OUTPATIENT)
Dept: CARDIOLOGY | Facility: MEDICAL CENTER | Age: 68
End: 2023-05-17
Payer: MEDICARE

## 2023-05-17 DIAGNOSIS — Z95.1 STATUS POST CORONARY ARTERY BYPASS GRAFT: ICD-10-CM

## 2023-05-17 PROCEDURE — G0423 INTENS CARDIAC REHAB NO EXER: HCPCS | Mod: 59 | Performed by: INTERNAL MEDICINE

## 2023-05-17 PROCEDURE — G0422 INTENS CARDIAC REHAB W/EXERC: HCPCS | Performed by: INTERNAL MEDICINE

## 2023-05-17 NOTE — PROGRESS NOTES
Maximiliano Bateman attended Intensive Cardiac Rehab today from 1600 to 1800. During his time he exercised and attended class.   His education today was a cooking class titled: Easy Salads and Dressings. Patient received handouts and class discussion pertaining to the topic.

## 2023-05-18 ENCOUNTER — APPOINTMENT (OUTPATIENT)
Dept: CARDIOLOGY | Facility: PHYSICIAN GROUP | Age: 68
End: 2023-05-18
Payer: MEDICARE

## 2023-05-18 ENCOUNTER — NON-PROVIDER VISIT (OUTPATIENT)
Dept: CARDIOLOGY | Facility: MEDICAL CENTER | Age: 68
End: 2023-05-18
Payer: MEDICARE

## 2023-05-18 DIAGNOSIS — Z95.1 STATUS POST CORONARY ARTERY BYPASS GRAFT: ICD-10-CM

## 2023-05-18 PROCEDURE — G0422 INTENS CARDIAC REHAB W/EXERC: HCPCS | Performed by: INTERNAL MEDICINE

## 2023-05-18 PROCEDURE — G0423 INTENS CARDIAC REHAB NO EXER: HCPCS | Mod: 59 | Performed by: INTERNAL MEDICINE

## 2023-05-18 NOTE — PROGRESS NOTES
Maximiliano Bateman attended Intensive Cardiac Rehab today from 1600 to 1800. During his time he exercised and attended class.   His education today was a workshop titled: stress. Patient received handouts and class discussion pertaining to the topic.

## 2023-05-23 ENCOUNTER — NON-PROVIDER VISIT (OUTPATIENT)
Dept: CARDIOLOGY | Facility: MEDICAL CENTER | Age: 68
End: 2023-05-23
Payer: MEDICARE

## 2023-05-23 DIAGNOSIS — Z95.1 STATUS POST CORONARY ARTERY BYPASS GRAFT: ICD-10-CM

## 2023-05-23 PROCEDURE — G0423 INTENS CARDIAC REHAB NO EXER: HCPCS | Mod: 59 | Performed by: INTERNAL MEDICINE

## 2023-05-23 PROCEDURE — G0422 INTENS CARDIAC REHAB W/EXERC: HCPCS | Performed by: INTERNAL MEDICINE

## 2023-05-24 ENCOUNTER — NON-PROVIDER VISIT (OUTPATIENT)
Dept: CARDIOLOGY | Facility: MEDICAL CENTER | Age: 68
End: 2023-05-24
Payer: MEDICARE

## 2023-05-24 DIAGNOSIS — Z95.1 STATUS POST CORONARY ARTERY BYPASS GRAFT: ICD-10-CM

## 2023-05-24 PROCEDURE — G0422 INTENS CARDIAC REHAB W/EXERC: HCPCS | Performed by: INTERNAL MEDICINE

## 2023-05-24 PROCEDURE — G0423 INTENS CARDIAC REHAB NO EXER: HCPCS | Mod: 59 | Performed by: INTERNAL MEDICINE

## 2023-05-24 NOTE — PROGRESS NOTES
Maximiliano Bateman attended Intensive Cardiac Rehab today from 1600 to 1800. During his time he exercised and attended class.   His education today was a cooking class titled: [employer] soups. Patient received handouts and class discussion pertaining to the topic.

## 2023-05-24 NOTE — PROGRESS NOTES
Maximiliano Maatt Fransico attended Intensive Cardiac Rehab today from 1600 to 1800. During his time he exercised and attended class.   His education today was a video titled: Targeting Nutritional Priorities. Patient received handouts and class discussion pertaining to the topic.

## 2023-05-25 ENCOUNTER — NON-PROVIDER VISIT (OUTPATIENT)
Dept: CARDIOLOGY | Facility: MEDICAL CENTER | Age: 68
End: 2023-05-25
Payer: MEDICARE

## 2023-05-25 DIAGNOSIS — Z95.1 STATUS POST CORONARY ARTERY BYPASS GRAFT: ICD-10-CM

## 2023-05-25 PROCEDURE — G0422 INTENS CARDIAC REHAB W/EXERC: HCPCS | Performed by: INTERNAL MEDICINE

## 2023-05-25 PROCEDURE — G0423 INTENS CARDIAC REHAB NO EXER: HCPCS | Mod: 59 | Performed by: INTERNAL MEDICINE

## 2023-05-26 NOTE — PROGRESS NOTES
Maximiliano Tenorio Khurramemily attended Intensive Cardiac Rehab today from 4:00pm to 6:00pm. During his time he exercised and attended class.   His education today was a workshop titled: Targeting Nutritional Priorities. Patient received handouts and class discussion pertaining to the topic.

## 2023-05-30 ENCOUNTER — NON-PROVIDER VISIT (OUTPATIENT)
Dept: CARDIOLOGY | Facility: MEDICAL CENTER | Age: 68
End: 2023-05-30
Payer: MEDICARE

## 2023-05-30 DIAGNOSIS — Z95.1 STATUS POST CORONARY ARTERY BYPASS GRAFT: ICD-10-CM

## 2023-05-30 PROCEDURE — G0423 INTENS CARDIAC REHAB NO EXER: HCPCS | Mod: 59 | Performed by: INTERNAL MEDICINE

## 2023-05-30 PROCEDURE — G0422 INTENS CARDIAC REHAB W/EXERC: HCPCS | Performed by: INTERNAL MEDICINE

## 2023-05-31 NOTE — NON-PROVIDER
Maximiliano Bateman attended Intensive Cardiac Rehab today from 1600 to 1800. During his time he exercised and attended class.   His education today was a workshop  titled: Biomechanical Limitations. Patient received handouts and class discussion pertaining to the topic.

## 2023-06-06 ENCOUNTER — NON-PROVIDER VISIT (OUTPATIENT)
Dept: CARDIOLOGY | Facility: MEDICAL CENTER | Age: 68
End: 2023-06-06
Payer: MEDICARE

## 2023-06-06 DIAGNOSIS — Z95.1 STATUS POST CORONARY ARTERY BYPASS GRAFT: ICD-10-CM

## 2023-06-06 PROCEDURE — G0423 INTENS CARDIAC REHAB NO EXER: HCPCS | Mod: 59 | Performed by: INTERNAL MEDICINE

## 2023-06-06 PROCEDURE — G0422 INTENS CARDIAC REHAB W/EXERC: HCPCS | Performed by: INTERNAL MEDICINE

## 2023-06-06 ASSESSMENT — FIBROSIS 4 INDEX: FIB4 SCORE: 0.34

## 2023-06-07 VITALS — WEIGHT: 190 LBS | BODY MASS INDEX: 28.79 KG/M2 | HEIGHT: 68 IN

## 2023-06-07 NOTE — PROGRESS NOTES
Maximiliano Bateman attended Intensive Cardiac Rehab today from 1600 to 1800. During his time he exercised and attended class.   His education today was a VIDEO titled: FUELING A HEALTHY BODY. Patient received handouts and class discussion pertaining to the topic.

## 2023-06-08 ENCOUNTER — NON-PROVIDER VISIT (OUTPATIENT)
Dept: CARDIOLOGY | Facility: MEDICAL CENTER | Age: 68
End: 2023-06-08
Payer: MEDICARE

## 2023-06-08 DIAGNOSIS — Z95.1 STATUS POST CORONARY ARTERY BYPASS GRAFT: ICD-10-CM

## 2023-06-08 PROCEDURE — G0422 INTENS CARDIAC REHAB W/EXERC: HCPCS | Performed by: INTERNAL MEDICINE

## 2023-06-08 PROCEDURE — G0423 INTENS CARDIAC REHAB NO EXER: HCPCS | Mod: 59 | Performed by: INTERNAL MEDICINE

## 2023-06-08 NOTE — PROGRESS NOTES
Maximiliano Bateman attended Intensive Cardiac Rehab today from 1600 to 1800. During his time he exercised and attended class.   His education today was a lecture and discussion titled: Fueling a Healthy Body. Patient received handouts and class discussion pertaining to the topic.

## 2023-06-13 ENCOUNTER — NON-PROVIDER VISIT (OUTPATIENT)
Dept: CARDIOLOGY | Facility: MEDICAL CENTER | Age: 68
End: 2023-06-13
Payer: MEDICARE

## 2023-06-13 DIAGNOSIS — Z95.1 STATUS POST CORONARY ARTERY BYPASS GRAFT: ICD-10-CM

## 2023-06-13 PROCEDURE — G0422 INTENS CARDIAC REHAB W/EXERC: HCPCS | Performed by: INTERNAL MEDICINE

## 2023-06-13 PROCEDURE — G0423 INTENS CARDIAC REHAB NO EXER: HCPCS | Mod: 59 | Performed by: INTERNAL MEDICINE

## 2023-06-14 ENCOUNTER — NON-PROVIDER VISIT (OUTPATIENT)
Dept: CARDIOLOGY | Facility: MEDICAL CENTER | Age: 68
End: 2023-06-14
Payer: MEDICARE

## 2023-06-14 DIAGNOSIS — Z95.1 STATUS POST CORONARY ARTERY BYPASS GRAFT: ICD-10-CM

## 2023-06-14 PROCEDURE — G0423 INTENS CARDIAC REHAB NO EXER: HCPCS | Mod: 59 | Performed by: INTERNAL MEDICINE

## 2023-06-14 PROCEDURE — G0422 INTENS CARDIAC REHAB W/EXERC: HCPCS | Performed by: INTERNAL MEDICINE

## 2023-06-14 NOTE — PROGRESS NOTES
Maximiliano Bateman attended Intensive Cardiac Rehab today from 1600 to 1800. During his time he exercised and attended class.   His education today was a nutrition class titled: Delicious Desserts. Patient received handouts and class discussion pertaining to the topic.  James also watched additional [employer] videos for education.

## 2023-06-14 NOTE — PROGRESS NOTES
Maximiliano Bateman attended Intensive Cardiac Rehab today from 1600 to 1800. During his time he exercised and attended class.   His education today was a VIDEO titled: HYPERTENSION AND HEART DISEASE. Patient received handouts and class discussion pertaining to the topic.

## 2023-06-15 ENCOUNTER — NON-PROVIDER VISIT (OUTPATIENT)
Dept: CARDIOLOGY | Facility: MEDICAL CENTER | Age: 68
End: 2023-06-15
Payer: MEDICARE

## 2023-06-15 DIAGNOSIS — Z95.1 STATUS POST CORONARY ARTERY BYPASS GRAFT: ICD-10-CM

## 2023-06-15 PROCEDURE — G0423 INTENS CARDIAC REHAB NO EXER: HCPCS | Mod: 59 | Performed by: INTERNAL MEDICINE

## 2023-06-15 PROCEDURE — G0422 INTENS CARDIAC REHAB W/EXERC: HCPCS | Performed by: INTERNAL MEDICINE

## 2023-06-16 NOTE — PROGRESS NOTES
Maximiliano Maatt Fransico attended Intensive Cardiac Rehab today from 1600 to 1800. During his time he exercised and attended class.   His education today was a lecture and discussion titled: Managing Heart Disease. Patient received handouts and class discussion pertaining to the topic.

## 2023-06-21 ENCOUNTER — NON-PROVIDER VISIT (OUTPATIENT)
Dept: CARDIOLOGY | Facility: MEDICAL CENTER | Age: 68
End: 2023-06-21
Payer: MEDICARE

## 2023-06-21 DIAGNOSIS — Z95.1 STATUS POST CORONARY ARTERY BYPASS GRAFT: ICD-10-CM

## 2023-06-21 PROCEDURE — G0422 INTENS CARDIAC REHAB W/EXERC: HCPCS | Performed by: INTERNAL MEDICINE

## 2023-06-21 PROCEDURE — G0423 INTENS CARDIAC REHAB NO EXER: HCPCS | Mod: 59 | Performed by: INTERNAL MEDICINE

## 2023-06-21 NOTE — PROGRESS NOTES
Maximiliano Bateman attended Intensive Cardiac Rehab today from 1600 to 1800. During his time he exercised and attended class.   His education today was a nutrition and cooking class titled: [employer] Plant-based Proteins. Patient received handouts and class discussion pertaining to the topic.

## 2023-06-22 ENCOUNTER — NON-PROVIDER VISIT (OUTPATIENT)
Dept: CARDIOLOGY | Facility: MEDICAL CENTER | Age: 68
End: 2023-06-22
Payer: MEDICARE

## 2023-06-22 DIAGNOSIS — Z95.1 STATUS POST CORONARY ARTERY BYPASS GRAFT: ICD-10-CM

## 2023-06-22 PROCEDURE — G0423 INTENS CARDIAC REHAB NO EXER: HCPCS | Mod: 59 | Performed by: INTERNAL MEDICINE

## 2023-06-22 PROCEDURE — G0422 INTENS CARDIAC REHAB W/EXERC: HCPCS | Performed by: INTERNAL MEDICINE

## 2023-06-22 NOTE — PROGRESS NOTES
Maximiliano Tenorio Khurramemily attended Intensive Cardiac Rehab today from 1600 to 1800. During his time he exercised and attended class.   His education today was a workshop titled: Label Reading. Patient received handouts and class discussion pertaining to the topic.

## 2023-07-11 ENCOUNTER — NON-PROVIDER VISIT (OUTPATIENT)
Dept: CARDIOLOGY | Facility: MEDICAL CENTER | Age: 68
End: 2023-07-11
Payer: MEDICARE

## 2023-07-11 DIAGNOSIS — Z95.1 STATUS POST CORONARY ARTERY BYPASS GRAFT: ICD-10-CM

## 2023-07-11 PROCEDURE — G0423 INTENS CARDIAC REHAB NO EXER: HCPCS | Mod: 59 | Performed by: INTERNAL MEDICINE

## 2023-07-11 PROCEDURE — G0422 INTENS CARDIAC REHAB W/EXERC: HCPCS | Performed by: INTERNAL MEDICINE

## 2023-07-19 ENCOUNTER — NON-PROVIDER VISIT (OUTPATIENT)
Dept: CARDIOLOGY | Facility: MEDICAL CENTER | Age: 68
End: 2023-07-19
Payer: MEDICARE

## 2023-07-19 DIAGNOSIS — Z95.1 STATUS POST CORONARY ARTERY BYPASS GRAFT: ICD-10-CM

## 2023-07-19 PROCEDURE — G0422 INTENS CARDIAC REHAB W/EXERC: HCPCS | Performed by: INTERNAL MEDICINE

## 2023-07-19 PROCEDURE — G0423 INTENS CARDIAC REHAB NO EXER: HCPCS | Mod: 59 | Performed by: INTERNAL MEDICINE

## 2023-07-20 ENCOUNTER — NON-PROVIDER VISIT (OUTPATIENT)
Dept: CARDIOLOGY | Facility: MEDICAL CENTER | Age: 68
End: 2023-07-20
Payer: MEDICARE

## 2023-07-20 DIAGNOSIS — Z95.1 STATUS POST CORONARY ARTERY BYPASS GRAFT: ICD-10-CM

## 2023-07-20 PROCEDURE — G0422 INTENS CARDIAC REHAB W/EXERC: HCPCS | Performed by: INTERNAL MEDICINE

## 2023-07-20 PROCEDURE — G0423 INTENS CARDIAC REHAB NO EXER: HCPCS | Mod: 59 | Performed by: INTERNAL MEDICINE

## 2023-07-20 NOTE — PROGRESS NOTES
Maximiliano Bateman attended Intensive Cardiac Rehab today from 1600 to 1800. During his time he exercised and attended class.   His education today was a cooking school titled: One [employer]. Patient received handouts and class discussion pertaining to the topic.

## 2023-07-25 NOTE — PROGRESS NOTES
Maximiliano Maatt Fransico attended Intensive Cardiac Rehab today from 1600 to 1800. During his time he exercised and attended class.   His education today was a lecture titled: Mindful Eating. Patient received handouts and class discussion pertaining to the topic.

## 2023-07-27 ENCOUNTER — NON-PROVIDER VISIT (OUTPATIENT)
Dept: CARDIOLOGY | Facility: MEDICAL CENTER | Age: 68
End: 2023-07-27
Payer: MEDICARE

## 2023-07-27 DIAGNOSIS — Z95.1 STATUS POST CORONARY ARTERY BYPASS GRAFT: ICD-10-CM

## 2023-07-27 PROCEDURE — G0422 INTENS CARDIAC REHAB W/EXERC: HCPCS | Performed by: INTERNAL MEDICINE

## 2023-07-27 PROCEDURE — G0423 INTENS CARDIAC REHAB NO EXER: HCPCS | Mod: 59 | Performed by: INTERNAL MEDICINE

## 2023-07-28 NOTE — NON-PROVIDER
Maximiliano attended [facility] today from 4-6pm.  During this time he exercised and attended class.   His education today was a workshop titled: Building Your Action Plan.  Patient received handouts and class discussion pertaining to the topic.

## 2023-08-03 ENCOUNTER — NON-PROVIDER VISIT (OUTPATIENT)
Dept: CARDIOLOGY | Facility: MEDICAL CENTER | Age: 68
End: 2023-08-03
Payer: MEDICARE

## 2023-08-03 DIAGNOSIS — Z95.1 STATUS POST CORONARY ARTERY BYPASS GRAFT: ICD-10-CM

## 2023-08-03 PROCEDURE — G0422 INTENS CARDIAC REHAB W/EXERC: HCPCS | Performed by: INTERNAL MEDICINE

## 2023-08-03 PROCEDURE — G0423 INTENS CARDIAC REHAB NO EXER: HCPCS | Mod: 59 | Performed by: INTERNAL MEDICINE

## 2023-08-04 NOTE — PROGRESS NOTES
Maximiliano Tenorio Khurramemily attended Intensive Cardiac Rehab today from 1600 to 1800. During his time he exercised and attended class.   His education today was a workshop titled: Robert. Patient received handouts and class discussion pertaining to the topic.

## 2023-08-08 ENCOUNTER — APPOINTMENT (OUTPATIENT)
Dept: CARDIOLOGY | Facility: MEDICAL CENTER | Age: 68
End: 2023-08-08
Payer: MEDICARE

## 2023-08-09 ENCOUNTER — NON-PROVIDER VISIT (OUTPATIENT)
Dept: CARDIOLOGY | Facility: MEDICAL CENTER | Age: 68
End: 2023-08-09
Payer: MEDICARE

## 2023-08-09 DIAGNOSIS — Z95.1 STATUS POST CORONARY ARTERY BYPASS GRAFT: ICD-10-CM

## 2023-08-09 PROCEDURE — G0422 INTENS CARDIAC REHAB W/EXERC: HCPCS | Performed by: INTERNAL MEDICINE

## 2023-08-09 PROCEDURE — G0423 INTENS CARDIAC REHAB NO EXER: HCPCS | Mod: 59 | Performed by: INTERNAL MEDICINE

## 2023-08-09 NOTE — PROGRESS NOTES
Maximiliano Bateman attended Intensive Cardiac Rehab today from 1600 to 1800. During his time he exercised and attended class.   His education today was a cooking school titled: Salads and Dressings. Patient received handouts and class discussion pertaining to the topic.

## 2023-08-10 ENCOUNTER — NON-PROVIDER VISIT (OUTPATIENT)
Dept: CARDIOLOGY | Facility: MEDICAL CENTER | Age: 68
End: 2023-08-10
Payer: MEDICARE

## 2023-08-10 DIAGNOSIS — Z95.1 STATUS POST CORONARY ARTERY BYPASS GRAFT: ICD-10-CM

## 2023-08-10 PROCEDURE — G0422 INTENS CARDIAC REHAB W/EXERC: HCPCS | Performed by: INTERNAL MEDICINE

## 2023-08-10 PROCEDURE — G0423 INTENS CARDIAC REHAB NO EXER: HCPCS | Mod: 59 | Performed by: INTERNAL MEDICINE

## 2023-08-11 NOTE — PROGRESS NOTES
Maximiliano Bateman attended Intensive Cardiac Rehab today from 1600 to 1800. During his time he exercised and attended class.   His education today was a workshop titled: reducing stress. Patient received handouts and class discussion pertaining to the topic.

## 2023-08-15 ENCOUNTER — TELEPHONE (OUTPATIENT)
Dept: HEALTH INFORMATION MANAGEMENT | Facility: OTHER | Age: 68
End: 2023-08-15
Payer: MEDICARE

## 2023-08-16 ENCOUNTER — APPOINTMENT (OUTPATIENT)
Dept: CARDIOLOGY | Facility: MEDICAL CENTER | Age: 68
End: 2023-08-16
Payer: MEDICARE

## 2023-08-17 ENCOUNTER — NON-PROVIDER VISIT (OUTPATIENT)
Dept: CARDIOLOGY | Facility: MEDICAL CENTER | Age: 68
End: 2023-08-17
Payer: MEDICARE

## 2023-08-17 DIAGNOSIS — Z95.1 STATUS POST CORONARY ARTERY BYPASS GRAFT: ICD-10-CM

## 2023-08-17 PROCEDURE — G0422 INTENS CARDIAC REHAB W/EXERC: HCPCS | Performed by: INTERNAL MEDICINE

## 2023-08-17 PROCEDURE — G0423 INTENS CARDIAC REHAB NO EXER: HCPCS | Mod: 59 | Performed by: INTERNAL MEDICINE

## 2023-08-17 NOTE — PROGRESS NOTES
Maximiliano Tenorio Khurramemily attended Intensive Cardiac Rehab today from 1600 to 1800. During his time he exercised and attended class.   His education today was a workshop titled: targeting nutritional priorities. Patient received handouts and class discussion pertaining to the topic.

## 2023-08-28 DIAGNOSIS — R93.1 AGATSTON CORONARY ARTERY CALCIUM SCORE GREATER THAN 400: ICD-10-CM

## 2023-11-28 DIAGNOSIS — I10 ESSENTIAL HYPERTENSION: ICD-10-CM

## 2023-11-29 RX ORDER — LOSARTAN POTASSIUM AND HYDROCHLOROTHIAZIDE 12.5; 5 MG/1; MG/1
1 TABLET ORAL DAILY
Qty: 90 TABLET | Refills: 0 | Status: SHIPPED | OUTPATIENT
Start: 2023-11-29

## 2023-11-29 NOTE — TELEPHONE ENCOUNTER
90 day courtesy refill sent to pharmacy.    To schedulers: please reach out to patient to schedule follow up, thank you!

## 2023-12-06 ENCOUNTER — TELEPHONE (OUTPATIENT)
Dept: CARDIOLOGY | Facility: MEDICAL CENTER | Age: 68
End: 2023-12-06
Payer: MEDICARE

## 2025-08-25 ENCOUNTER — HOSPITAL ENCOUNTER (OUTPATIENT)
Dept: RADIOLOGY | Facility: MEDICAL CENTER | Age: 70
End: 2025-08-25
Payer: COMMERCIAL

## 2025-08-25 ENCOUNTER — OFFICE VISIT (OUTPATIENT)
Dept: URGENT CARE | Facility: PHYSICIAN GROUP | Age: 70
End: 2025-08-25
Payer: MEDICARE

## 2025-08-25 VITALS
SYSTOLIC BLOOD PRESSURE: 124 MMHG | RESPIRATION RATE: 16 BRPM | HEART RATE: 87 BPM | OXYGEN SATURATION: 95 % | HEIGHT: 68 IN | BODY MASS INDEX: 30.16 KG/M2 | TEMPERATURE: 98.8 F | DIASTOLIC BLOOD PRESSURE: 78 MMHG | WEIGHT: 199 LBS

## 2025-08-25 DIAGNOSIS — M54.50 ACUTE ON CHRONIC LOW BACK PAIN: ICD-10-CM

## 2025-08-25 DIAGNOSIS — G89.29 ACUTE ON CHRONIC LOW BACK PAIN: Primary | ICD-10-CM

## 2025-08-25 DIAGNOSIS — M54.50 ACUTE ON CHRONIC LOW BACK PAIN: Primary | ICD-10-CM

## 2025-08-25 DIAGNOSIS — G89.29 ACUTE ON CHRONIC LOW BACK PAIN: ICD-10-CM

## 2025-08-25 DIAGNOSIS — M62.830 LUMBAR PARASPINAL MUSCLE SPASM: ICD-10-CM

## 2025-08-25 PROCEDURE — 72100 X-RAY EXAM L-S SPINE 2/3 VWS: CPT

## 2025-08-25 RX ORDER — LIDOCAINE 50 MG/G
1 PATCH TOPICAL EVERY 24 HOURS
Qty: 15 PATCH | Refills: 0 | Status: SHIPPED | OUTPATIENT
Start: 2025-08-25

## 2025-08-25 RX ORDER — CYCLOBENZAPRINE HCL 5 MG
5-10 TABLET ORAL
Qty: 15 TABLET | Refills: 0 | Status: SHIPPED | OUTPATIENT
Start: 2025-08-25

## 2025-08-25 RX ORDER — KETOROLAC TROMETHAMINE 15 MG/ML
15 INJECTION, SOLUTION INTRAMUSCULAR; INTRAVENOUS ONCE
Status: COMPLETED | OUTPATIENT
Start: 2025-08-25 | End: 2025-08-25

## 2025-08-25 RX ADMIN — KETOROLAC TROMETHAMINE 15 MG: 15 INJECTION, SOLUTION INTRAMUSCULAR; INTRAVENOUS at 13:40

## 2025-08-25 ASSESSMENT — ENCOUNTER SYMPTOMS
WEIGHT LOSS: 0
MYALGIAS: 0
DIARRHEA: 0
HEADACHES: 0
BACK PAIN: 1
TINGLING: 0
CONSTIPATION: 0
LOSS OF CONSCIOUSNESS: 0
FALLS: 0
WEAKNESS: 0
NECK PAIN: 0
FOCAL WEAKNESS: 0

## 2025-09-04 ENCOUNTER — APPOINTMENT (OUTPATIENT)
Dept: PHYSICAL MEDICINE AND REHAB | Facility: MEDICAL CENTER | Age: 70
End: 2025-09-04
Payer: COMMERCIAL